# Patient Record
Sex: FEMALE | Race: WHITE | NOT HISPANIC OR LATINO | Employment: FULL TIME | URBAN - METROPOLITAN AREA
[De-identification: names, ages, dates, MRNs, and addresses within clinical notes are randomized per-mention and may not be internally consistent; named-entity substitution may affect disease eponyms.]

---

## 2023-04-17 ENCOUNTER — TELEPHONE (OUTPATIENT)
Dept: PRIMARY CARE | Facility: CLINIC | Age: 48
End: 2023-04-17
Payer: COMMERCIAL

## 2023-04-17 DIAGNOSIS — F98.8 ATTENTION DEFICIT DISORDER, UNSPECIFIED HYPERACTIVITY PRESENCE: ICD-10-CM

## 2023-04-17 DIAGNOSIS — G89.29 OTHER CHRONIC PAIN: ICD-10-CM

## 2023-04-17 PROBLEM — M54.2 NECK PAIN: Status: ACTIVE | Noted: 2023-04-17

## 2023-04-17 PROBLEM — I83.93 VARICOSE VEINS OF LEGS: Status: ACTIVE | Noted: 2023-04-17

## 2023-04-17 PROBLEM — J30.9 ALLERGIC RHINITIS: Status: ACTIVE | Noted: 2023-04-17

## 2023-04-17 PROBLEM — R31.21 ASYMPTOMATIC MICROSCOPIC HEMATURIA: Status: ACTIVE | Noted: 2023-04-17

## 2023-04-17 PROBLEM — M75.100 TEAR OF TENDON OF ROTATOR CUFF: Status: ACTIVE | Noted: 2023-04-17

## 2023-04-17 PROBLEM — D64.9 ANEMIA: Status: ACTIVE | Noted: 2023-04-17

## 2023-04-17 PROBLEM — E73.9 LACTOSE INTOLERANCE: Status: ACTIVE | Noted: 2023-04-17

## 2023-04-17 PROBLEM — R05.9 COUGH: Status: ACTIVE | Noted: 2023-04-17

## 2023-04-17 PROBLEM — M47.816 DJD (DEGENERATIVE JOINT DISEASE), LUMBAR: Status: ACTIVE | Noted: 2023-04-17

## 2023-04-17 PROBLEM — E55.9 VITAMIN D DEFICIENCY: Status: ACTIVE | Noted: 2023-04-17

## 2023-04-17 PROBLEM — Z20.822 EXPOSURE TO COVID-19 VIRUS: Status: ACTIVE | Noted: 2023-04-17

## 2023-04-17 PROBLEM — R53.83 FATIGUE: Status: ACTIVE | Noted: 2023-04-17

## 2023-04-17 PROBLEM — F11.90 CHRONIC NARCOTIC USE: Status: ACTIVE | Noted: 2023-04-17

## 2023-04-17 PROBLEM — M19.011 PRIMARY OSTEOARTHRITIS OF RIGHT SHOULDER: Status: ACTIVE | Noted: 2023-04-17

## 2023-04-17 RX ORDER — NALOXONE HYDROCHLORIDE 4 MG/.1ML
SPRAY NASAL
COMMUNITY
Start: 2020-03-30

## 2023-04-17 RX ORDER — LYSINE HCL 500 MG
1 TABLET ORAL 2 TIMES DAILY
COMMUNITY
Start: 2017-07-10

## 2023-04-17 RX ORDER — CYCLOBENZAPRINE HCL 10 MG
1 TABLET ORAL 3 TIMES DAILY PRN
COMMUNITY
Start: 2015-05-29 | End: 2023-10-27 | Stop reason: SDUPTHER

## 2023-04-17 RX ORDER — FLUTICASONE PROPIONATE 50 MCG
2 SPRAY, SUSPENSION (ML) NASAL DAILY
COMMUNITY
End: 2023-05-02 | Stop reason: SDUPTHER

## 2023-04-17 RX ORDER — HYDROCODONE BITARTRATE AND ACETAMINOPHEN 5; 300 MG/1; MG/1
TABLET ORAL
COMMUNITY
End: 2023-04-20 | Stop reason: SDUPTHER

## 2023-04-17 RX ORDER — METHYLPHENIDATE HYDROCHLORIDE 30 MG/1
1 CAPSULE, EXTENDED RELEASE ORAL EVERY MORNING
COMMUNITY
End: 2023-04-20 | Stop reason: SDUPTHER

## 2023-04-17 RX ORDER — ERGOCALCIFEROL 1.25 MG/1
1 CAPSULE ORAL
COMMUNITY
Start: 2019-07-25 | End: 2023-05-02 | Stop reason: ALTCHOICE

## 2023-04-17 RX ORDER — CELECOXIB 100 MG/1
1 CAPSULE ORAL 2 TIMES DAILY
COMMUNITY
End: 2023-05-02 | Stop reason: SDUPTHER

## 2023-04-17 NOTE — TELEPHONE ENCOUNTER
Refill(s) requested for:    1) Hydrocodone-Acetaminophen (5-300 mg)  2) Methylphenidate HCl ER CD (30 mg)    Pharmacy:   Pharmacy Address: [id number] [facility]     LR: 03/20/2023  LV:  01/23/2023  NV: 05/02/2023

## 2023-04-20 RX ORDER — METHYLPHENIDATE HYDROCHLORIDE 30 MG/1
30 CAPSULE, EXTENDED RELEASE ORAL EVERY MORNING
Qty: 30 CAPSULE | Refills: 0 | Status: SHIPPED | OUTPATIENT
Start: 2023-04-20 | End: 2023-05-02 | Stop reason: SDUPTHER

## 2023-04-20 RX ORDER — HYDROCODONE BITARTRATE AND ACETAMINOPHEN 5; 300 MG/1; MG/1
1 TABLET ORAL DAILY
Qty: 20 TABLET | Refills: 0 | Status: SHIPPED | OUTPATIENT
Start: 2023-04-20 | End: 2023-05-02 | Stop reason: SDUPTHER

## 2023-05-02 ENCOUNTER — OFFICE VISIT (OUTPATIENT)
Dept: PRIMARY CARE | Facility: CLINIC | Age: 48
End: 2023-05-02
Payer: COMMERCIAL

## 2023-05-02 VITALS
DIASTOLIC BLOOD PRESSURE: 78 MMHG | HEIGHT: 65 IN | SYSTOLIC BLOOD PRESSURE: 118 MMHG | HEART RATE: 83 BPM | OXYGEN SATURATION: 100 % | BODY MASS INDEX: 22.99 KG/M2 | WEIGHT: 138 LBS

## 2023-05-02 DIAGNOSIS — F98.8 ATTENTION DEFICIT DISORDER, UNSPECIFIED HYPERACTIVITY PRESENCE: Primary | ICD-10-CM

## 2023-05-02 DIAGNOSIS — J30.1 SEASONAL ALLERGIC RHINITIS DUE TO POLLEN: ICD-10-CM

## 2023-05-02 DIAGNOSIS — Z02.83 ENCOUNTER FOR DRUG SCREENING: ICD-10-CM

## 2023-05-02 DIAGNOSIS — R53.83 FATIGUE, UNSPECIFIED TYPE: ICD-10-CM

## 2023-05-02 DIAGNOSIS — Z51.81 ENCOUNTER FOR THERAPEUTIC DRUG LEVEL MONITORING: ICD-10-CM

## 2023-05-02 DIAGNOSIS — M47.816 SPONDYLOSIS OF LUMBAR REGION WITHOUT MYELOPATHY OR RADICULOPATHY: ICD-10-CM

## 2023-05-02 DIAGNOSIS — F11.90 CHRONIC NARCOTIC USE: ICD-10-CM

## 2023-05-02 DIAGNOSIS — G89.29 OTHER CHRONIC PAIN: ICD-10-CM

## 2023-05-02 PROCEDURE — 80361 OPIATES 1 OR MORE: CPT

## 2023-05-02 PROCEDURE — 80373 DRUG SCREENING TRAMADOL: CPT

## 2023-05-02 PROCEDURE — 80307 DRUG TEST PRSMV CHEM ANLYZR: CPT

## 2023-05-02 PROCEDURE — 80365 DRUG SCREENING OXYCODONE: CPT

## 2023-05-02 PROCEDURE — 80346 BENZODIAZEPINES1-12: CPT

## 2023-05-02 PROCEDURE — 80354 DRUG SCREENING FENTANYL: CPT

## 2023-05-02 PROCEDURE — 80368 SEDATIVE HYPNOTICS: CPT

## 2023-05-02 PROCEDURE — 99214 OFFICE O/P EST MOD 30 MIN: CPT | Performed by: FAMILY MEDICINE

## 2023-05-02 PROCEDURE — 80358 DRUG SCREENING METHADONE: CPT

## 2023-05-02 RX ORDER — HYDROCODONE BITARTRATE AND ACETAMINOPHEN 5; 300 MG/1; MG/1
1 TABLET ORAL DAILY
Qty: 30 TABLET | Refills: 0 | Status: SHIPPED | OUTPATIENT
Start: 2023-06-07 | End: 2023-07-17 | Stop reason: SDUPTHER

## 2023-05-02 RX ORDER — HYDROCODONE BITARTRATE AND ACETAMINOPHEN 5; 300 MG/1; MG/1
1 TABLET ORAL DAILY
Qty: 30 TABLET | Refills: 0 | Status: SHIPPED | OUTPATIENT
Start: 2023-05-10 | End: 2023-07-17 | Stop reason: SDUPTHER

## 2023-05-02 RX ORDER — MULTIVITAMIN/IRON/FOLIC ACID 18MG-0.4MG
1 TABLET ORAL DAILY
COMMUNITY

## 2023-05-02 RX ORDER — HYDROCODONE BITARTRATE AND ACETAMINOPHEN 5; 300 MG/1; MG/1
1 TABLET ORAL DAILY
Qty: 30 TABLET | Refills: 0 | Status: SHIPPED | OUTPATIENT
Start: 2023-07-05 | End: 2023-07-17 | Stop reason: SDUPTHER

## 2023-05-02 RX ORDER — FLUTICASONE PROPIONATE 50 MCG
2 SPRAY, SUSPENSION (ML) NASAL DAILY
Qty: 16 G | Refills: 3 | Status: SHIPPED | OUTPATIENT
Start: 2023-05-02 | End: 2023-10-27 | Stop reason: SDUPTHER

## 2023-05-02 RX ORDER — METHYLPHENIDATE HYDROCHLORIDE 30 MG/1
30 CAPSULE, EXTENDED RELEASE ORAL EVERY MORNING
Qty: 30 CAPSULE | Refills: 0 | Status: SHIPPED | OUTPATIENT
Start: 2023-05-10 | End: 2023-07-17 | Stop reason: SDUPTHER

## 2023-05-02 RX ORDER — VIT C/E/ZN/COPPR/LUTEIN/ZEAXAN 250MG-90MG
25 CAPSULE ORAL DAILY
COMMUNITY

## 2023-05-02 RX ORDER — ZINC GLUCONATE 100 MG
TABLET ORAL
COMMUNITY

## 2023-05-02 RX ORDER — METHYLPHENIDATE HYDROCHLORIDE 30 MG/1
30 CAPSULE, EXTENDED RELEASE ORAL EVERY MORNING
Qty: 30 CAPSULE | Refills: 0 | Status: SHIPPED | OUTPATIENT
Start: 2023-07-05 | End: 2023-07-17 | Stop reason: SDUPTHER

## 2023-05-02 RX ORDER — METHYLPHENIDATE HYDROCHLORIDE 30 MG/1
30 CAPSULE, EXTENDED RELEASE ORAL EVERY MORNING
Qty: 30 CAPSULE | Refills: 0 | Status: SHIPPED | OUTPATIENT
Start: 2023-06-07 | End: 2023-07-17 | Stop reason: SDUPTHER

## 2023-05-02 RX ORDER — CELECOXIB 100 MG/1
100 CAPSULE ORAL 2 TIMES DAILY
Qty: 180 CAPSULE | Refills: 2 | Status: SHIPPED | OUTPATIENT
Start: 2023-05-02 | End: 2023-10-27 | Stop reason: SDUPTHER

## 2023-05-02 RX ORDER — ASCORBIC ACID 500 MG
500 TABLET ORAL DAILY
COMMUNITY

## 2023-05-02 ASSESSMENT — PATIENT HEALTH QUESTIONNAIRE - PHQ9
2. FEELING DOWN, DEPRESSED OR HOPELESS: NOT AT ALL
SUM OF ALL RESPONSES TO PHQ9 QUESTIONS 1 & 2: 0
1. LITTLE INTEREST OR PLEASURE IN DOING THINGS: NOT AT ALL

## 2023-05-05 LAB
6-ACETYLMORPHINE: <25 NG/ML
7-AMINOCLONAZEPAM: <25 NG/ML
ALPHA-HYDROXYALPRAZOLAM: <25 NG/ML
ALPHA-HYDROXYMIDAZOLAM: <25 NG/ML
ALPRAZOLAM: <25 NG/ML
AMPHETAMINE (PRESENCE) IN URINE BY SCREEN METHOD: ABNORMAL
BARBITURATES PRESENCE IN URINE BY SCREEN METHOD: ABNORMAL
CANNABINOIDS IN URINE BY SCREEN METHOD: ABNORMAL
CHLORDIAZEPOXIDE: <25 NG/ML
CLONAZEPAM: <25 NG/ML
COCAINE (PRESENCE) IN URINE BY SCREEN METHOD: ABNORMAL
CODEINE: <50 NG/ML
CREATINE, URINE FOR DRUG: 106.2 MG/DL
DIAZEPAM: <25 NG/ML
DRUG SCREEN COMMENT URINE: ABNORMAL
EDDP: <25 NG/ML
FENTANYL CONFIRMATION, URINE: <2.5 NG/ML
HYDROCODONE: 44 NG/ML
HYDROMORPHONE: 41 NG/ML
LORAZEPAM: <25 NG/ML
METHADONE CONFIRMATION,URINE: <25 NG/ML
MIDAZOLAM: <25 NG/ML
MORPHINE URINE: <50 NG/ML
NORDIAZEPAM: <25 NG/ML
NORFENTANYL: <2.5 NG/ML
NORHYDROCODONE: 307 NG/ML
NOROXYCODONE: <25 NG/ML
O-DESMETHYLTRAMADOL: <50 NG/ML
OXAZEPAM: <25 NG/ML
OXYCODONE: <25 NG/ML
OXYMORPHONE: <25 NG/ML
PHENCYCLIDINE (PRESENCE) IN URINE BY SCREEN METHOD: ABNORMAL
TEMAZEPAM: <25 NG/ML
TRAMADOL: <50 NG/ML
ZOLPIDEM METABOLITE (ZCA): <25 NG/ML
ZOLPIDEM: <25 NG/ML

## 2023-07-17 ENCOUNTER — OFFICE VISIT (OUTPATIENT)
Dept: PRIMARY CARE | Facility: CLINIC | Age: 48
End: 2023-07-17
Payer: COMMERCIAL

## 2023-07-17 VITALS — DIASTOLIC BLOOD PRESSURE: 60 MMHG | SYSTOLIC BLOOD PRESSURE: 128 MMHG

## 2023-07-17 DIAGNOSIS — G89.4 CHRONIC PAIN SYNDROME: ICD-10-CM

## 2023-07-17 DIAGNOSIS — G89.29 OTHER CHRONIC PAIN: ICD-10-CM

## 2023-07-17 DIAGNOSIS — F11.90 CHRONIC NARCOTIC USE: ICD-10-CM

## 2023-07-17 DIAGNOSIS — M47.816 SPONDYLOSIS OF LUMBAR REGION WITHOUT MYELOPATHY OR RADICULOPATHY: ICD-10-CM

## 2023-07-17 DIAGNOSIS — F98.8 ATTENTION DEFICIT DISORDER, UNSPECIFIED HYPERACTIVITY PRESENCE: ICD-10-CM

## 2023-07-17 DIAGNOSIS — F98.8 ATTENTION DEFICIT DISORDER (ADD) WITHOUT HYPERACTIVITY: Primary | ICD-10-CM

## 2023-07-17 PROCEDURE — 99213 OFFICE O/P EST LOW 20 MIN: CPT | Performed by: FAMILY MEDICINE

## 2023-07-17 RX ORDER — METHYLPHENIDATE HYDROCHLORIDE 30 MG/1
30 CAPSULE, EXTENDED RELEASE ORAL EVERY MORNING
Qty: 30 CAPSULE | Refills: 0 | Status: SHIPPED | OUTPATIENT
Start: 2023-09-08 | End: 2023-10-27 | Stop reason: SDUPTHER

## 2023-07-17 RX ORDER — HYDROCODONE BITARTRATE AND ACETAMINOPHEN 5; 300 MG/1; MG/1
1 TABLET ORAL DAILY
Qty: 30 TABLET | Refills: 0 | Status: SHIPPED | OUTPATIENT
Start: 2023-10-06 | End: 2023-10-27 | Stop reason: SDUPTHER

## 2023-07-17 RX ORDER — METHYLPHENIDATE HYDROCHLORIDE 30 MG/1
30 CAPSULE, EXTENDED RELEASE ORAL EVERY MORNING
Qty: 30 CAPSULE | Refills: 0 | Status: SHIPPED | OUTPATIENT
Start: 2023-10-06 | End: 2023-10-27 | Stop reason: SDUPTHER

## 2023-07-17 RX ORDER — METHYLPHENIDATE HYDROCHLORIDE 30 MG/1
30 CAPSULE, EXTENDED RELEASE ORAL EVERY MORNING
Qty: 30 CAPSULE | Refills: 0 | Status: SHIPPED | OUTPATIENT
Start: 2023-08-11 | End: 2023-10-27 | Stop reason: SDUPTHER

## 2023-07-17 RX ORDER — HYDROCODONE BITARTRATE AND ACETAMINOPHEN 5; 300 MG/1; MG/1
1 TABLET ORAL DAILY
Qty: 30 TABLET | Refills: 0 | Status: SHIPPED | OUTPATIENT
Start: 2023-09-08 | End: 2023-10-27 | Stop reason: SDUPTHER

## 2023-07-17 RX ORDER — HYDROCODONE BITARTRATE AND ACETAMINOPHEN 5; 300 MG/1; MG/1
1 TABLET ORAL DAILY
Qty: 30 TABLET | Refills: 0 | Status: SHIPPED | OUTPATIENT
Start: 2023-08-11 | End: 2023-10-27 | Stop reason: SDUPTHER

## 2023-07-17 NOTE — PROGRESS NOTES
HPI 48 y.o. female presents for evaluation and refill of controlled substance.      ADD is well controlled with current dose of Ritalin.  Patient states she is compliant with medication.  Her last dose was this morning.  She denies tremors, palpitations and insomnia.    Chronic osteoarthritis pain is managed with 1/2-1 Norco daily.  Her last dose was last night.  She denies constipation.  She has also using Celebrex and Flexeril as needed for additional pain relief.  She remains active, employed and has not fallen.    Past Medical History:   Diagnosis Date    Anxiety disorder, unspecified 10/21/2019    Anxiety and depression    Complete rotator cuff tear or rupture of right shoulder, not specified as traumatic 12/15/2017    Complete tear of right rotator cuff    COVID-19     COVID-19 virus infection    Metabolic syndrome 2018    Syndrome X, metabolic    Personal history of other medical treatment     History of mammogram      Past Surgical History:   Procedure Laterality Date     SECTION, CLASSIC  2014     Section    COLONOSCOPY  2018    Complete Colonoscopy     No family history on file.   Social History     Socioeconomic History    Marital status:      Spouse name: Not on file    Number of children: Not on file    Years of education: Not on file    Highest education level: Not on file   Occupational History    Not on file   Tobacco Use    Smoking status: Some Days     Types: Cigarettes    Smokeless tobacco: Never   Substance and Sexual Activity    Alcohol use: Never    Drug use: Yes     Types: Hydrocodone, Methylphenidate    Sexual activity: Not on file   Other Topics Concern    Not on file   Social History Narrative    Not on file     Social Determinants of Health     Financial Resource Strain: Not on file   Food Insecurity: Not on file   Transportation Needs: Not on file   Physical Activity: Not on file   Stress: Not on file   Social Connections: Not on file   Intimate  Partner Violence: Not on file   Housing Stability: Not on file       Current Outpatient Medications on File Prior to Visit   Medication Sig Dispense Refill    HYDROcodone-acetaminophen (Vicodin) 5-300 mg tablet Take 1 tablet by mouth once daily. TAKE 1/2 TO 1 TABLET BY MOUTH DAILY AS NEEDED FOR PAIN Do not start before May 10, 2023. 30 tablet 0    methylphenidate CD (Metadate CD) 30 mg daily capsule Take 1 capsule (30 mg) by mouth once daily in the morning. Do not start before May 10, 2023. 30 capsule 0    ascorbic acid (Vitamin C) 500 mg tablet Take 1 tablet (500 mg) by mouth once daily.      b complex 0.4 mg tablet Take 1 tablet by mouth once daily.      calcium carbonate-vit D3-min 600 mg calcium- 400 unit tablet Take 1 tablet by mouth in the morning and 1 tablet before bedtime.      celecoxib (CeleBREX) 100 mg capsule Take 1 capsule (100 mg) by mouth 2 times a day. 180 capsule 2    cholecalciferol (Vitamin D3) 25 MCG (1000 UT) capsule Take 1 capsule (25 mcg) by mouth once daily.      cyclobenzaprine (Flexeril) 10 mg tablet Take 1 tablet (10 mg) by mouth 3 times a day as needed for muscle spasms.      fluticasone (Flonase) 50 mcg/actuation nasal spray Administer 2 sprays into each nostril once daily. 16 g 3    HYDROcodone-acetaminophen (Vicodin) 5-300 mg tablet Take 1 tablet by mouth once daily. TAKE 1/2 TO 1 TABLET BY MOUTH DAILY AS NEEDED FOR PAIN Do not start before June 7, 2023. 30 tablet 0    HYDROcodone-acetaminophen (Vicodin) 5-300 mg tablet Take 1 tablet by mouth once daily. TAKE 1/2 TO 1 TABLET BY MOUTH DAILY AS NEEDED FOR PAIN Do not start before July 5, 2023. 30 tablet 0    iron bis-glycinat/vit C/FA/B12 (GENTLE IRON ORAL) Take by mouth.      methylphenidate CD (Metadate CD) 30 mg daily capsule Take 1 capsule (30 mg) by mouth once daily in the morning. Do not start before June 7, 2023. 30 capsule 0    methylphenidate CD (Metadate CD) 30 mg daily capsule Take 1 capsule (30 mg) by mouth once daily in the  morning. Do not start before July 5, 2023. 30 capsule 0    naloxone (Narcan) 4 mg/0.1 mL nasal spray Administer into affected nostril(s). 1 actuation in one nostril x 1, may reat dose q2-3 mins until responsive or EMS arrives      zinc gluconate 100 mg tablet Take by mouth.       No current facility-administered medications on file prior to visit.       No Known Allergies    Visit Vitals  /60   Smoking Status Some Days        EXAM:  Alert and oriented ×3.  No acute distress.  No tremors noted.  Gait is normal.  Mood and affect are normal.     Assessment/Diagnosis  1. Attention deficit disorder (ADD) without hyperactivity    2. Chronic narcotic use    3. Chronic pain syndrome    4. Spondylosis of lumbar region without myelopathy or radiculopathy  - HYDROcodone-acetaminophen (Vicodin) 5-300 mg tablet; Take 1 tablet by mouth once daily. TAKE 1/2 TO 1 TABLET BY MOUTH DAILY AS NEEDED FOR PAIN Do not start before October 6, 2023.  Dispense: 30 tablet; Refill: 0  - HYDROcodone-acetaminophen (Vicodin) 5-300 mg tablet; Take 1 tablet by mouth once daily. TAKE 1/2 TO 1 TABLET BY MOUTH DAILY AS NEEDED FOR PAIN Do not start before September 8, 2023.  Dispense: 30 tablet; Refill: 0  - HYDROcodone-acetaminophen (Vicodin) 5-300 mg tablet; Take 1 tablet by mouth once daily. TAKE 1/2 TO 1 TABLET BY MOUTH DAILY AS NEEDED FOR PAIN Do not start before August 11, 2023.  Dispense: 30 tablet; Refill: 0    6. Attention deficit disorder, unspecified hyperactivity presence  - methylphenidate CD (Metadate CD) 30 mg daily capsule; Take 1 capsule (30 mg) by mouth once daily in the morning. Do not start before August 11, 2023.  Dispense: 30 capsule; Refill: 0  - methylphenidate CD (Metadate CD) 30 mg daily capsule; Take 1 capsule (30 mg) by mouth once daily in the morning. Do not start before October 6, 2023.  Dispense: 30 capsule; Refill: 0  - methylphenidate CD (Metadate CD) 30 mg daily capsule; Take 1 capsule (30 mg) by mouth once daily  in the morning. Do not start before September 8, 2023.  Dispense: 30 capsule; Refill: 0    .    Plan    [place] reviewed.  Controlled Substance Agreement is current.  Urine drug screen up to date.    CONTROLLED SUBSTANCE USE:   Patient is aware of Dr. Diaz's and Estefany Darling's practice rules for use of scheduled medication.  Has a signed contract stating that patient will only receive controlled substance prescriptions from Dr. Diaz, will only receive a one month supply, will fill prescriptions at one pharmacy, and agrees to a random urine drug screen.  Patient is aware that she must have an office appointment every 90 days to continue to receive benzodiazepines or narcotics.        Follow up in 3 months for annual comprehensive medical evaluation    I will continue to monitor, evaluate, assess and treat all problems/diagnoses as appropriate and continue to collaborate with specialists.    Contact office or send a  [employer] message with any questions or concerns    Patient will only be notified of labs that require medical intervention.    Prescriptions will not be filled unless you are compliant with your follow up appointments or have a follow up appointment scheduled as per instruction of your physician. Refills should be requested at the time of your visit.    **Charting was completed using voice recognition technology and may include unintended errors**    Gary Diaz DO, FACOFP  Senior Attending Physician  [facility]  [id number]  [place] [address] [phone]

## 2023-10-25 ENCOUNTER — LAB (OUTPATIENT)
Dept: LAB | Facility: LAB | Age: 48
End: 2023-10-25
Payer: COMMERCIAL

## 2023-10-25 DIAGNOSIS — R53.83 FATIGUE, UNSPECIFIED TYPE: ICD-10-CM

## 2023-10-25 DIAGNOSIS — Z13.220 SCREENING, LIPID: ICD-10-CM

## 2023-10-25 DIAGNOSIS — E55.9 VITAMIN D DEFICIENCY: ICD-10-CM

## 2023-10-25 DIAGNOSIS — Z00.00 ENCOUNTER FOR HEALTH MAINTENANCE EXAMINATION: ICD-10-CM

## 2023-10-25 LAB
25(OH)D3 SERPL-MCNC: 27 NG/ML (ref 30–100)
ALBUMIN SERPL BCP-MCNC: 3.9 G/DL (ref 3.4–5)
ALP SERPL-CCNC: 55 U/L (ref 33–110)
ALT SERPL W P-5'-P-CCNC: 11 U/L (ref 7–45)
ANION GAP SERPL CALC-SCNC: 10 MMOL/L (ref 10–20)
APPEARANCE UR: CLEAR
AST SERPL W P-5'-P-CCNC: 14 U/L (ref 9–39)
BACTERIA #/AREA URNS AUTO: ABNORMAL /HPF
BILIRUB SERPL-MCNC: 0.4 MG/DL (ref 0–1.2)
BILIRUB UR STRIP.AUTO-MCNC: NEGATIVE MG/DL
BUN SERPL-MCNC: 7 MG/DL (ref 6–23)
CALCIUM SERPL-MCNC: 9.2 MG/DL (ref 8.6–10.3)
CHLORIDE SERPL-SCNC: 105 MMOL/L (ref 98–107)
CHOLEST SERPL-MCNC: 150 MG/DL (ref 0–199)
CHOLESTEROL/HDL RATIO: 2.8
CO2 SERPL-SCNC: 27 MMOL/L (ref 21–32)
COLOR UR: YELLOW
CREAT SERPL-MCNC: 0.54 MG/DL (ref 0.5–1.05)
ERYTHROCYTE [DISTWIDTH] IN BLOOD BY AUTOMATED COUNT: 15.1 % (ref 11.5–14.5)
GFR SERPL CREATININE-BSD FRML MDRD: >90 ML/MIN/1.73M*2
GLUCOSE SERPL-MCNC: 81 MG/DL (ref 74–99)
GLUCOSE UR STRIP.AUTO-MCNC: NEGATIVE MG/DL
HCT VFR BLD AUTO: 38.3 % (ref 36–46)
HDLC SERPL-MCNC: 54 MG/DL
HGB BLD-MCNC: 11.9 G/DL (ref 12–16)
KETONES UR STRIP.AUTO-MCNC: NEGATIVE MG/DL
LDLC SERPL CALC-MCNC: 83 MG/DL
LEUKOCYTE ESTERASE UR QL STRIP.AUTO: NEGATIVE
MCH RBC QN AUTO: 27 PG (ref 26–34)
MCHC RBC AUTO-ENTMCNC: 31.1 G/DL (ref 32–36)
MCV RBC AUTO: 87 FL (ref 80–100)
NITRITE UR QL STRIP.AUTO: NEGATIVE
NON HDL CHOLESTEROL: 96 MG/DL (ref 0–149)
NRBC BLD-RTO: 0 /100 WBCS (ref 0–0)
PH UR STRIP.AUTO: 7 [PH]
PLATELET # BLD AUTO: 309 X10*3/UL (ref 150–450)
PMV BLD AUTO: 10.9 FL (ref 7.5–11.5)
POTASSIUM SERPL-SCNC: 4.6 MMOL/L (ref 3.5–5.3)
PROT SERPL-MCNC: 6.5 G/DL (ref 6.4–8.2)
PROT UR STRIP.AUTO-MCNC: NEGATIVE MG/DL
RBC # BLD AUTO: 4.41 X10*6/UL (ref 4–5.2)
RBC # UR STRIP.AUTO: ABNORMAL /UL
RBC #/AREA URNS AUTO: ABNORMAL /HPF
SODIUM SERPL-SCNC: 137 MMOL/L (ref 136–145)
SP GR UR STRIP.AUTO: 1.01
SQUAMOUS #/AREA URNS AUTO: ABNORMAL /HPF
TRIGL SERPL-MCNC: 63 MG/DL (ref 0–149)
TSH SERPL-ACNC: 1.7 MIU/L (ref 0.44–3.98)
UROBILINOGEN UR STRIP.AUTO-MCNC: <2 MG/DL
VLDL: 13 MG/DL (ref 0–40)
WBC # BLD AUTO: 7.5 X10*3/UL (ref 4.4–11.3)
WBC #/AREA URNS AUTO: ABNORMAL /HPF

## 2023-10-25 PROCEDURE — 84443 ASSAY THYROID STIM HORMONE: CPT

## 2023-10-25 PROCEDURE — 36415 COLL VENOUS BLD VENIPUNCTURE: CPT

## 2023-10-25 PROCEDURE — 81001 URINALYSIS AUTO W/SCOPE: CPT

## 2023-10-25 PROCEDURE — 82306 VITAMIN D 25 HYDROXY: CPT

## 2023-10-25 PROCEDURE — 85027 COMPLETE CBC AUTOMATED: CPT

## 2023-10-25 PROCEDURE — 80053 COMPREHEN METABOLIC PANEL: CPT

## 2023-10-25 PROCEDURE — 80061 LIPID PANEL: CPT

## 2023-10-27 ENCOUNTER — OFFICE VISIT (OUTPATIENT)
Dept: PRIMARY CARE | Facility: CLINIC | Age: 48
End: 2023-10-27
Payer: COMMERCIAL

## 2023-10-27 VITALS
SYSTOLIC BLOOD PRESSURE: 122 MMHG | DIASTOLIC BLOOD PRESSURE: 70 MMHG | OXYGEN SATURATION: 99 % | WEIGHT: 134 LBS | HEIGHT: 65 IN | HEART RATE: 108 BPM | BODY MASS INDEX: 22.33 KG/M2

## 2023-10-27 DIAGNOSIS — M47.816 SPONDYLOSIS OF LUMBAR REGION WITHOUT MYELOPATHY OR RADICULOPATHY: ICD-10-CM

## 2023-10-27 DIAGNOSIS — G89.29 OTHER CHRONIC PAIN: ICD-10-CM

## 2023-10-27 DIAGNOSIS — R19.5 LOOSE STOOLS: ICD-10-CM

## 2023-10-27 DIAGNOSIS — J30.1 SEASONAL ALLERGIC RHINITIS DUE TO POLLEN: ICD-10-CM

## 2023-10-27 DIAGNOSIS — Z13.220 SCREENING, LIPID: ICD-10-CM

## 2023-10-27 DIAGNOSIS — Z12.11 COLON CANCER SCREENING: Primary | ICD-10-CM

## 2023-10-27 DIAGNOSIS — F98.8 ATTENTION DEFICIT DISORDER, UNSPECIFIED HYPERACTIVITY PRESENCE: ICD-10-CM

## 2023-10-27 DIAGNOSIS — Z00.00 ENCOUNTER FOR HEALTH MAINTENANCE EXAMINATION: ICD-10-CM

## 2023-10-27 DIAGNOSIS — Z12.31 ENCOUNTER FOR SCREENING MAMMOGRAM FOR MALIGNANT NEOPLASM OF BREAST: ICD-10-CM

## 2023-10-27 DIAGNOSIS — E55.9 VITAMIN D DEFICIENCY: ICD-10-CM

## 2023-10-27 PROCEDURE — 93000 ELECTROCARDIOGRAM COMPLETE: CPT | Performed by: FAMILY MEDICINE

## 2023-10-27 PROCEDURE — 99213 OFFICE O/P EST LOW 20 MIN: CPT | Performed by: FAMILY MEDICINE

## 2023-10-27 PROCEDURE — 99396 PREV VISIT EST AGE 40-64: CPT | Performed by: FAMILY MEDICINE

## 2023-10-27 RX ORDER — METHYLPHENIDATE HYDROCHLORIDE 30 MG/1
30 CAPSULE, EXTENDED RELEASE ORAL EVERY MORNING
Qty: 30 CAPSULE | Refills: 0 | Status: SHIPPED | OUTPATIENT
Start: 2023-12-03 | End: 2024-01-18 | Stop reason: SDUPTHER

## 2023-10-27 RX ORDER — FLUTICASONE PROPIONATE 50 MCG
2 SPRAY, SUSPENSION (ML) NASAL DAILY
Qty: 16 G | Refills: 3 | Status: SHIPPED | OUTPATIENT
Start: 2023-10-27 | End: 2024-04-18 | Stop reason: SDUPTHER

## 2023-10-27 RX ORDER — METHYLPHENIDATE HYDROCHLORIDE 30 MG/1
30 CAPSULE, EXTENDED RELEASE ORAL EVERY MORNING
Qty: 30 CAPSULE | Refills: 0 | Status: SHIPPED | OUTPATIENT
Start: 2023-11-05 | End: 2024-01-18 | Stop reason: SDUPTHER

## 2023-10-27 RX ORDER — METHYLPHENIDATE HYDROCHLORIDE 30 MG/1
30 CAPSULE, EXTENDED RELEASE ORAL EVERY MORNING
Qty: 30 CAPSULE | Refills: 0 | Status: SHIPPED | OUTPATIENT
Start: 2023-12-31 | End: 2024-01-18 | Stop reason: SDUPTHER

## 2023-10-27 RX ORDER — CYCLOBENZAPRINE HCL 10 MG
10 TABLET ORAL 3 TIMES DAILY PRN
Qty: 60 TABLET | Refills: 2 | Status: SHIPPED | OUTPATIENT
Start: 2023-10-27

## 2023-10-27 RX ORDER — HYDROCODONE BITARTRATE AND ACETAMINOPHEN 5; 300 MG/1; MG/1
1 TABLET ORAL DAILY
Qty: 30 TABLET | Refills: 0 | Status: SHIPPED | OUTPATIENT
Start: 2023-12-31 | End: 2024-01-18 | Stop reason: SDUPTHER

## 2023-10-27 RX ORDER — ERGOCALCIFEROL 1.25 MG/1
50000 CAPSULE ORAL
Qty: 12 CAPSULE | Refills: 3 | Status: SHIPPED | OUTPATIENT
Start: 2023-10-27 | End: 2024-09-27

## 2023-10-27 RX ORDER — HYDROCODONE BITARTRATE AND ACETAMINOPHEN 5; 300 MG/1; MG/1
1 TABLET ORAL DAILY
Qty: 30 TABLET | Refills: 0 | Status: SHIPPED | OUTPATIENT
Start: 2023-11-05 | End: 2024-01-18 | Stop reason: SDUPTHER

## 2023-10-27 RX ORDER — CELECOXIB 100 MG/1
100 CAPSULE ORAL 2 TIMES DAILY
Qty: 180 CAPSULE | Refills: 2 | Status: SHIPPED | OUTPATIENT
Start: 2023-10-27 | End: 2024-04-18 | Stop reason: SDUPTHER

## 2023-10-27 RX ORDER — HYDROCODONE BITARTRATE AND ACETAMINOPHEN 5; 300 MG/1; MG/1
1 TABLET ORAL DAILY
Qty: 30 TABLET | Refills: 0 | Status: SHIPPED | OUTPATIENT
Start: 2023-12-03 | End: 2024-01-18 | Stop reason: SDUPTHER

## 2023-10-27 ASSESSMENT — PATIENT HEALTH QUESTIONNAIRE - PHQ9
2. FEELING DOWN, DEPRESSED OR HOPELESS: NOT AT ALL
1. LITTLE INTEREST OR PLEASURE IN DOING THINGS: NOT AT ALL
SUM OF ALL RESPONSES TO PHQ9 QUESTIONS 1 & 2: 0

## 2023-10-27 NOTE — PATIENT INSTRUCTIONS
Schedule PAP smear    Bowels:  fiber 30 gm/day;  cereals: Fiber One or All Bran  probiotic x 30 days    Mammogram ordered  Cologuard ordered

## 2023-10-27 NOTE — PROGRESS NOTES
Annual Comprehensive Medical Exam    48 y.o. female presents for annual comprehensive medical evaluation and preventive services screening.  No recent hospitalizations, surgeries or significant injuries.    HPI    Digestion:  still gasey, crampy, loose stools.  No pain, bleeding.  Colonoscopy in 2018.  Dx Colagenous Colitis.  Was having hematochezia in 2018.  Tx with Rx (not sure what) caused resolution.      Vitamin D is low despite supplement    Osteoarthritis of the lumbar spine.  Patient takes Celebrex at least once per day.  Also uses 1 Norco daily, usually only 1/2 pill at bedtime.  Pain in winter is worse.  Has home exercise program.  Last dose of Norco was last night 1/2 pill.    ADD is well controlled with current dose of Ritalin.  Patient is compliant with medication and notes no side effects.    Due for mammogram and Pap smear.  Colon cancer screening done in 2018.  Patient requesting Cologuard    Past Medical History:   Diagnosis Date    Anxiety disorder, unspecified 10/21/2019    Anxiety and depression    Complete rotator cuff tear or rupture of right shoulder, not specified as traumatic 12/15/2017    Complete tear of right rotator cuff    COVID-19     COVID-19 virus infection    Metabolic syndrome 2018    Syndrome X, metabolic    Personal history of other medical treatment     History of mammogram      Past Surgical History:   Procedure Laterality Date     SECTION, CLASSIC  2014     Section    COLONOSCOPY  2018    Complete Colonoscopy     No family history on file.   Social History     Socioeconomic History    Marital status:      Spouse name: Not on file    Number of children: Not on file    Years of education: Not on file    Highest education level: Not on file   Occupational History    Not on file   Tobacco Use    Smoking status: Some Days     Types: Cigarettes    Smokeless tobacco: Never   Substance and Sexual Activity    Alcohol use: Never    Drug use: Yes      Types: Hydrocodone, Methylphenidate    Sexual activity: Not on file   Other Topics Concern    Not on file   Social History Narrative    Not on file     Social Determinants of Health     Financial Resource Strain: Not on file   Food Insecurity: Not on file   Transportation Needs: Not on file   Physical Activity: Not on file   Stress: Not on file   Social Connections: Not on file   Intimate Partner Violence: Not on file   Housing Stability: Not on file       Current Outpatient Medications on File Prior to Visit   Medication Sig Dispense Refill    ascorbic acid (Vitamin C) 500 mg tablet Take 1 tablet (500 mg) by mouth once daily.      b complex 0.4 mg tablet Take 1 tablet by mouth once daily.      calcium carbonate-vit D3-min 600 mg calcium- 400 unit tablet Take 1 tablet by mouth in the morning and 1 tablet before bedtime.      celecoxib (CeleBREX) 100 mg capsule Take 1 capsule (100 mg) by mouth 2 times a day. 180 capsule 2    cholecalciferol (Vitamin D3) 25 MCG (1000 UT) capsule Take 1 capsule (25 mcg) by mouth once daily.      cyclobenzaprine (Flexeril) 10 mg tablet Take 1 tablet (10 mg) by mouth 3 times a day as needed for muscle spasms.      fluticasone (Flonase) 50 mcg/actuation nasal spray Administer 2 sprays into each nostril once daily. 16 g 3    HYDROcodone-acetaminophen (Vicodin) 5-300 mg tablet Take 1 tablet by mouth once daily. TAKE 1/2 TO 1 TABLET BY MOUTH DAILY AS NEEDED FOR PAIN Do not start before October 6, 2023. 30 tablet 0    iron bis-glycinat/vit C/FA/B12 (GENTLE IRON ORAL) Take by mouth.      methylphenidate CD (Metadate CD) 30 mg daily capsule Take 1 capsule (30 mg) by mouth once daily in the morning. Do not start before August 11, 2023. 30 capsule 0    naloxone (Narcan) 4 mg/0.1 mL nasal spray Administer into affected nostril(s). 1 actuation in one nostril x 1, may reat dose q2-3 mins until responsive or EMS arrives      zinc gluconate 100 mg tablet Take by mouth.       "HYDROcodone-acetaminophen (Vicodin) 5-300 mg tablet Take 1 tablet by mouth once daily. TAKE 1/2 TO 1 TABLET BY MOUTH DAILY AS NEEDED FOR PAIN Do not start before September 8, 2023. 30 tablet 0    HYDROcodone-acetaminophen (Vicodin) 5-300 mg tablet Take 1 tablet by mouth once daily. TAKE 1/2 TO 1 TABLET BY MOUTH DAILY AS NEEDED FOR PAIN Do not start before August 11, 2023. 30 tablet 0    methylphenidate CD (Metadate CD) 30 mg daily capsule Take 1 capsule (30 mg) by mouth once daily in the morning. Do not start before October 6, 2023. 30 capsule 0    methylphenidate CD (Metadate CD) 30 mg daily capsule Take 1 capsule (30 mg) by mouth once daily in the morning. Do not start before September 8, 2023. 30 capsule 0     No current facility-administered medications on file prior to visit.       No Known Allergies    Complete review of systems is negative today except for that mentioned in the history of present illness.  In particular patient denies chest pain, shortness of breath, headaches and GI disturbances.      Visit Vitals  /70   Pulse 108   Ht 1.657 m (5' 5.25\")   Wt 60.8 kg (134 lb)   SpO2 99%   BMI 22.13 kg/m²   Smoking Status Some Days   BSA 1.67 m²      Physical Exam  Gen.: Alert and oriented ×3 female in no acute distress.  HEENT: Head is normocephalic.  Pupils equal and reactive to light.  Tympanic membranes are clear.  Pharynx is clear.  Neck is supple without adenopathy or carotid bruits.  No masses or thyromegaly  Heart: Regular rate and rhythm without murmurs.  Lungs: Clear to auscultation bilaterally.  Breasts: deferred to GYN at pt request.  Pelvic: Deferred to GYN at pt request.  Abdomen: Soft with normal bowel sounds.  No masses or pain to palpation.  No bruits auscultated.  Extremities: Good range of motion of all joints.  No significant edema. Pedal pulses +1-2/4  Skin: No significant or irregular nevi visualized.  Neuro: No signs of focal neurologic deficit.  No tremor.  Speech and hearing are " normal.  DTRs +3/4;  Muscle Strength +5/5.  Musculoskeletal: Spine with good ROM.  No scoliosis.  Leg lengths are equal.  Psych: normal affect.  No suicidal ideation.  Good judgement and insight.     Lab reviewed in detail with patient  ECG with normal sinus rhythm      Diagnosis/Plan  1. Encounter for health maintenance examination  - Comprehensive Metabolic Panel; Future  - CBC; Future  - Lipid Panel; Future  - TSH with reflex to Free T4 if abnormal; Future  - Urinalysis with Reflex Microscopic; Future  - ECG 12 lead (Clinic Performed)    3. Screening, lipid  - Lipid Panel; Future    4. Vitamin D deficiency  - Vitamin D 25-Hydroxy,Total (for eval of Vitamin D levels); Future  - ergocalciferol (Vitamin D-2) 1.25 MG (29561 UT) capsule; Take 1 capsule (50,000 Units) by mouth 1 (one) time per week.  Dispense: 12 capsule; Refill: 3    5. Colon cancer screening  - Cologuard® colon cancer screening; Future  - Cologuard® colon cancer screening    6. Loose stools  - Cryptosporidium antigen, stool; Future  - Giardia antigen; Future    7. Encounter for screening mammogram for malignant neoplasm of breast  - BI mammo bilateral screening tomosynthesis; Future    8. Lumbar DJD chronic pain    - celecoxib (CeleBREX) 100 mg capsule; Take 1 capsule (100 mg) by mouth 2 times a day.  Dispense: 180 capsule; Refill: 2  - HYDROcodone-acetaminophen (Vicodin) 5-300 mg tablet; Take 1 tablet by mouth once daily. TAKE 1/2 TO 1 TABLET BY MOUTH DAILY AS NEEDED FOR PAIN Do not start before December 31, 2023.  Dispense: 30 tablet; Refill: 0  - HYDROcodone-acetaminophen (Vicodin) 5-300 mg tablet; Take 1 tablet by mouth once daily. TAKE 1/2 TO 1 TABLET BY MOUTH DAILY AS NEEDED FOR PAIN Do not start before November 5, 2023.  Dispense: 30 tablet; Refill: 0  - HYDROcodone-acetaminophen (Vicodin) 5-300 mg tablet; Take 1 tablet by mouth once daily. TAKE 1/2 TO 1 TABLET BY MOUTH DAILY AS NEEDED FOR PAIN Do not start before December 3, 2023.  Dispense:  30 tablet; Refill: 0    9. Seasonal allergic rhinitis due to pollen  - fluticasone (Flonase) 50 mcg/actuation nasal spray; Administer 2 sprays into each nostril once daily.  Dispense: 16 g; Refill: 3    10. Attention deficit disorder, unspecified hyperactivity presence  - methylphenidate CD (Metadate CD) 30 mg daily capsule; Take 1 capsule (30 mg) by mouth once daily in the morning. Do not start before November 5, 2023.  Dispense: 30 capsule; Refill: 0  - methylphenidate CD (Metadate CD) 30 mg daily capsule; Take 1 capsule (30 mg) by mouth once daily in the morning. Do not start before December 3, 2023.  Dispense: 30 capsule; Refill: 0  - methylphenidate CD (Metadate CD) 30 mg daily capsule; Take 1 capsule (30 mg) by mouth once daily in the morning. Do not start before December 31, 2023.  Dispense: 30 capsule; Refill: 0    11. Spondylosis of lumbar region without myelopathy or radiculopathy  - cyclobenzaprine (Flexeril) 10 mg tablet; Take 1 tablet (10 mg) by mouth 3 times a day as needed for muscle spasms.  Dispense: 60 tablet; Refill: 2          Follow up in 3 months for controlled substance refill; 6 months for medical management  Return to office for Pap smear    I will continue to monitor, evaluate, assess and treat all problems/diagnoses as appropriate and continue to collaborate with specialists.    Contact office or send a  MY Chart message with any questions or concerns    Encouraged to sign up with my  My Chart  Patient will only be notified of labs that require medical intervention.    Prescriptions will not be filled unless you are compliant with your follow up appointments or have a follow up appointment scheduled as per instruction of your physician. Refills should be requested at the time of your visit.    **Charting was completed using voice recognition technology and may include unintended errors**    Gary Diaz DO, FACOFP  [id number]  [place] [address] [phone]      Gary  DO Joe, PEDRITOP

## 2023-12-14 ENCOUNTER — APPOINTMENT (OUTPATIENT)
Dept: PRIMARY CARE | Facility: CLINIC | Age: 48
End: 2023-12-14
Payer: COMMERCIAL

## 2024-01-02 ENCOUNTER — APPOINTMENT (OUTPATIENT)
Dept: PRIMARY CARE | Facility: CLINIC | Age: 49
End: 2024-01-02
Payer: COMMERCIAL

## 2024-01-15 ENCOUNTER — PROCEDURE VISIT (OUTPATIENT)
Dept: PRIMARY CARE | Facility: CLINIC | Age: 49
End: 2024-01-15
Payer: COMMERCIAL

## 2024-01-15 VITALS
SYSTOLIC BLOOD PRESSURE: 120 MMHG | TEMPERATURE: 98.7 F | WEIGHT: 133 LBS | BODY MASS INDEX: 21.96 KG/M2 | DIASTOLIC BLOOD PRESSURE: 84 MMHG

## 2024-01-15 DIAGNOSIS — Z12.4 CERVICAL CANCER SCREENING: ICD-10-CM

## 2024-01-15 PROCEDURE — 87624 HPV HI-RISK TYP POOLED RSLT: CPT

## 2024-01-15 PROCEDURE — 99213 OFFICE O/P EST LOW 20 MIN: CPT | Performed by: FAMILY MEDICINE

## 2024-01-15 PROCEDURE — 88175 CYTOPATH C/V AUTO FLUID REDO: CPT

## 2024-01-15 ASSESSMENT — PATIENT HEALTH QUESTIONNAIRE - PHQ9
1. LITTLE INTEREST OR PLEASURE IN DOING THINGS: NOT AT ALL
2. FEELING DOWN, DEPRESSED OR HOPELESS: NOT AT ALL
SUM OF ALL RESPONSES TO PHQ9 QUESTIONS 1 AND 2: 0

## 2024-01-15 NOTE — PROGRESS NOTES
Subjective   Patient ID: Loan [place] is a 48 y.o. female who presents for Gynecologic Exam.    Pt presents for annual GYN exam:    Last PAP: 4-5 years ago  See epic review, 2017 last PAP  LMP: early Dec  Menses has been irregular   1993- C section  Not currently sexually active  ,  had post op complications            Review of Systems    Objective   /84 (BP Location: Left arm, Patient Position: Sitting)   Temp 37.1 °C (98.7 °F)   Wt 60.3 kg (133 lb)   LMP 2023 (Approximate)   BMI 21.96 kg/m²     Physical Exam  Vitals and nursing note reviewed. Exam conducted with a chaperone present.   Genitourinary:     Vagina: Normal.      Cervix: Normal.      Uterus: Normal.       Adnexa: Right adnexa normal and left adnexa normal.         Assessment/Plan   Diagnoses and all orders for this visit:  Cervical cancer screening  -     THINPREP PAP TEST       Will notify pt of results when available  Kerri Nava DO

## 2024-01-18 ENCOUNTER — OFFICE VISIT (OUTPATIENT)
Dept: PRIMARY CARE | Facility: CLINIC | Age: 49
End: 2024-01-18
Payer: COMMERCIAL

## 2024-01-18 VITALS
HEIGHT: 65 IN | HEART RATE: 80 BPM | SYSTOLIC BLOOD PRESSURE: 128 MMHG | DIASTOLIC BLOOD PRESSURE: 80 MMHG | WEIGHT: 134 LBS | OXYGEN SATURATION: 98 % | BODY MASS INDEX: 22.33 KG/M2

## 2024-01-18 DIAGNOSIS — F11.90 CHRONIC NARCOTIC USE: ICD-10-CM

## 2024-01-18 DIAGNOSIS — M47.816 SPONDYLOSIS OF LUMBAR REGION WITHOUT MYELOPATHY OR RADICULOPATHY: Primary | ICD-10-CM

## 2024-01-18 DIAGNOSIS — F98.8 ATTENTION DEFICIT DISORDER (ADD) WITHOUT HYPERACTIVITY: ICD-10-CM

## 2024-01-18 PROBLEM — I83.90 VARICOSE VEINS OF LOWER EXTREMITY: Status: ACTIVE | Noted: 2023-04-17

## 2024-01-18 PROBLEM — J30.1 ALLERGIC RHINITIS DUE TO POLLEN: Status: ACTIVE | Noted: 2024-01-18

## 2024-01-18 PROBLEM — U07.1 DISEASE DUE TO SEVERE ACUTE RESPIRATORY SYNDROME CORONAVIRUS 2 (SARS-COV-2): Status: RESOLVED | Noted: 2024-01-18 | Resolved: 2024-01-18

## 2024-01-18 PROBLEM — J06.9 ACUTE UPPER RESPIRATORY INFECTION: Status: RESOLVED | Noted: 2024-01-18 | Resolved: 2024-01-18

## 2024-01-18 PROBLEM — M62.830 SPASM OF MUSCLE OF LOWER BACK: Status: ACTIVE | Noted: 2023-04-17

## 2024-01-18 PROBLEM — R19.5 LOOSE STOOLS: Status: RESOLVED | Noted: 2024-01-18 | Resolved: 2024-01-18

## 2024-01-18 PROBLEM — M75.81 TENDINITIS OF RIGHT ROTATOR CUFF: Status: ACTIVE | Noted: 2024-01-18

## 2024-01-18 PROCEDURE — 99213 OFFICE O/P EST LOW 20 MIN: CPT | Performed by: FAMILY MEDICINE

## 2024-01-18 RX ORDER — HYDROCODONE BITARTRATE AND ACETAMINOPHEN 5; 300 MG/1; MG/1
1 TABLET ORAL DAILY
Qty: 30 TABLET | Refills: 0 | Status: SHIPPED | OUTPATIENT
Start: 2024-03-01 | End: 2024-04-18 | Stop reason: SDUPTHER

## 2024-01-18 RX ORDER — HYDROCODONE BITARTRATE AND ACETAMINOPHEN 5; 300 MG/1; MG/1
1 TABLET ORAL DAILY
Qty: 30 TABLET | Refills: 0 | Status: SHIPPED | OUTPATIENT
Start: 2024-02-02 | End: 2024-04-18 | Stop reason: SDUPTHER

## 2024-01-18 RX ORDER — HYDROCODONE BITARTRATE AND ACETAMINOPHEN 5; 300 MG/1; MG/1
1 TABLET ORAL DAILY
Qty: 30 TABLET | Refills: 0 | Status: SHIPPED | OUTPATIENT
Start: 2024-03-30 | End: 2024-04-18 | Stop reason: SDUPTHER

## 2024-01-18 RX ORDER — METHYLPHENIDATE HYDROCHLORIDE 30 MG/1
30 CAPSULE, EXTENDED RELEASE ORAL EVERY MORNING
Qty: 30 CAPSULE | Refills: 0 | Status: SHIPPED | OUTPATIENT
Start: 2024-03-01 | End: 2024-04-18 | Stop reason: SDUPTHER

## 2024-01-18 RX ORDER — METHYLPHENIDATE HYDROCHLORIDE 30 MG/1
30 CAPSULE, EXTENDED RELEASE ORAL EVERY MORNING
Qty: 30 CAPSULE | Refills: 0 | Status: SHIPPED | OUTPATIENT
Start: 2024-03-30 | End: 2024-04-18 | Stop reason: SDUPTHER

## 2024-01-18 RX ORDER — METHYLPHENIDATE HYDROCHLORIDE 30 MG/1
30 CAPSULE, EXTENDED RELEASE ORAL EVERY MORNING
Qty: 30 CAPSULE | Refills: 0 | Status: SHIPPED | OUTPATIENT
Start: 2024-02-02 | End: 2024-04-18 | Stop reason: SDUPTHER

## 2024-01-18 ASSESSMENT — PATIENT HEALTH QUESTIONNAIRE - PHQ9
SUM OF ALL RESPONSES TO PHQ9 QUESTIONS 1 AND 2: 0
1. LITTLE INTEREST OR PLEASURE IN DOING THINGS: NOT AT ALL
2. FEELING DOWN, DEPRESSED OR HOPELESS: NOT AT ALL

## 2024-01-18 NOTE — PROGRESS NOTES
HPI 49 y.o. female presents for evaluation and refill of controlled substance.      Chronic pain due to lumbar arthritis.  Uses 1/2-1 Norco daily, usually at bedtime to manage pain.    ADD is managed with Metadate CD 30 mg daily.  She takes this medication most days of the week.    Past Medical History:   Diagnosis Date    Acute upper respiratory infection 2024    Anxiety disorder, unspecified 10/21/2019    Anxiety and depression    Complete rotator cuff tear or rupture of right shoulder, not specified as traumatic 12/15/2017    Complete tear of right rotator cuff    COVID-19     COVID-19 virus infection    Disease due to severe acute respiratory syndrome coronavirus 2 (SARS-CoV-2) 2024    Comment on above: 2019. Positive antibodies 2021;    Loose stools 2024    Metabolic syndrome 2018    Syndrome X, metabolic    Personal history of other medical treatment     History of mammogram      Past Surgical History:   Procedure Laterality Date     SECTION, CLASSIC  2014     Section    COLONOSCOPY  2018    Complete Colonoscopy     No family history on file.   Social History     Socioeconomic History    Marital status:      Spouse name: Not on file    Number of children: Not on file    Years of education: Not on file    Highest education level: Not on file   Occupational History    Not on file   Tobacco Use    Smoking status: Some Days     Types: Cigarettes    Smokeless tobacco: Never   Substance and Sexual Activity    Alcohol use: Never    Drug use: Yes     Types: Hydrocodone, Methylphenidate    Sexual activity: Not on file   Other Topics Concern    Not on file   Social History Narrative    Not on file     Social Determinants of Health     Financial Resource Strain: Not on file   Food Insecurity: Not on file   Transportation Needs: Not on file   Physical Activity: Not on file   Stress: Not on file   Social Connections: Not on file   Intimate Partner  Violence: Not on file   Housing Stability: Not on file       Current Outpatient Medications on File Prior to Visit   Medication Sig Dispense Refill    ascorbic acid (Vitamin C) 500 mg tablet Take 1 tablet (500 mg) by mouth once daily.      b complex 0.4 mg tablet Take 1 tablet by mouth once daily.      calcium carbonate-vit D3-min 600 mg calcium- 400 unit tablet Take 1 tablet by mouth in the morning and 1 tablet before bedtime.      celecoxib (CeleBREX) 100 mg capsule Take 1 capsule (100 mg) by mouth 2 times a day. 180 capsule 2    cholecalciferol (Vitamin D3) 25 MCG (1000 UT) capsule Take 1 capsule (25 mcg) by mouth once daily.      cyclobenzaprine (Flexeril) 10 mg tablet Take 1 tablet (10 mg) by mouth 3 times a day as needed for muscle spasms. 60 tablet 2    fluticasone (Flonase) 50 mcg/actuation nasal spray Administer 2 sprays into each nostril once daily. 16 g 3    HYDROcodone-acetaminophen (Vicodin) 5-300 mg tablet Take 1 tablet by mouth once daily. TAKE 1/2 TO 1 TABLET BY MOUTH DAILY AS NEEDED FOR PAIN Do not start before December 31, 2023. 30 tablet 0    HYDROcodone-acetaminophen (Vicodin) 5-300 mg tablet Take 1 tablet by mouth once daily. TAKE 1/2 TO 1 TABLET BY MOUTH DAILY AS NEEDED FOR PAIN Do not start before November 5, 2023. 30 tablet 0    HYDROcodone-acetaminophen (Vicodin) 5-300 mg tablet Take 1 tablet by mouth once daily. TAKE 1/2 TO 1 TABLET BY MOUTH DAILY AS NEEDED FOR PAIN Do not start before December 3, 2023. 30 tablet 0    iron bis-glycinat/vit C/FA/B12 (GENTLE IRON ORAL) Take by mouth.      methylphenidate CD (Metadate CD) 30 mg daily capsule Take 1 capsule (30 mg) by mouth once daily in the morning. Do not start before November 5, 2023. 30 capsule 0    methylphenidate CD (Metadate CD) 30 mg daily capsule Take 1 capsule (30 mg) by mouth once daily in the morning. Do not start before December 3, 2023. 30 capsule 0    methylphenidate CD (Metadate CD) 30 mg daily capsule Take 1 capsule (30 mg) by  "mouth once daily in the morning. Do not start before December 31, 2023. 30 capsule 0    naloxone (Narcan) 4 mg/0.1 mL nasal spray Administer into affected nostril(s). 1 actuation in one nostril x 1, may reat dose q2-3 mins until responsive or EMS arrives      zinc gluconate 100 mg tablet Take by mouth.      ergocalciferol (Vitamin D-2) 1.25 MG (64119 UT) capsule Take 1 capsule (50,000 Units) by mouth 1 (one) time per week. (Patient not taking: Reported on 1/15/2024) 12 capsule 3     No current facility-administered medications on file prior to visit.       No Known Allergies    Visit Vitals  /80   Pulse 80   Ht 1.658 m (5' 5.26\")   Wt 60.8 kg (134 lb)   LMP 12/04/2023 (Approximate)   SpO2 98%   BMI 22.12 kg/m²   Smoking Status Some Days   BSA 1.67 m²        EXAM:  Alert and oriented ×3.  No acute distress.  No tremors noted.  Gait is normal.  Mood and affect are normal.     Assessment/Diagnosis  1. Spondylosis of lumbar region without myelopathy or radiculopathy  - HYDROcodone-acetaminophen (Vicodin) 5-300 mg tablet; Take 1 tablet by mouth once daily. TAKE 1/2 TO 1 TABLET BY MOUTH DAILY AS NEEDED FOR PAIN Do not start before February 2, 2024.  Dispense: 30 tablet; Refill: 0  - HYDROcodone-acetaminophen (Vicodin) 5-300 mg tablet; Take 1 tablet by mouth once daily. TAKE 1/2 TO 1 TABLET BY MOUTH DAILY AS NEEDED FOR PAIN Do not start before March 1, 2024.  Dispense: 30 tablet; Refill: 0  - HYDROcodone-acetaminophen (Vicodin) 5-300 mg tablet; Take 1 tablet by mouth once daily. TAKE 1/2 TO 1 TABLET BY MOUTH DAILY AS NEEDED FOR PAIN Do not start before March 30, 2024.  Dispense: 30 tablet; Refill: 0    2. Chronic narcotic use    3. Attention deficit disorder (ADD) without hyperactivity  - methylphenidate CD (Metadate CD) 30 mg daily capsule; Take 1 capsule (30 mg) by mouth once daily in the morning. Do not start before February 2, 2024.  Dispense: 30 capsule; Refill: 0  - methylphenidate CD (Metadate CD) 30 mg daily " capsule; Take 1 capsule (30 mg) by mouth once daily in the morning. Do not start before March 1, 2024.  Dispense: 30 capsule; Refill: 0  - methylphenidate CD (Metadate CD) 30 mg daily capsule; Take 1 capsule (30 mg) by mouth once daily in the morning. Do not start before March 30, 2024.  Dispense: 30 capsule; Refill: 0        Plan    OARRS reviewed.  Controlled Substance Agreement is current.  Urine drug screen up to date.    CONTROLLED SUBSTANCE USE:   Patient is aware of Dr. Diaz's and Estefany Darling's practice rules for use of scheduled medication.  Has a signed contract stating that patient will only receive controlled substance prescriptions from Dr. Diaz, will only receive a one month supply, will fill prescriptions at one pharmacy, and agrees to a random urine drug screen.  Patient is aware that she must have an office appointment every 90 days to continue to receive benzodiazepines or narcotics.        Follow up in 3 months for medical management    I will continue to monitor, evaluate, assess and treat all problems/diagnoses as appropriate and continue to collaborate with specialists.    Contact office or send a  [facility] message with any questions or concerns    Patient will only be notified of labs that require medical intervention.    Prescriptions will not be filled unless you are compliant with your follow up appointments or have a follow up appointment scheduled as per instruction of your physician. Refills should be requested at the time of your visit.    **Charting was completed using voice recognition technology and may include unintended errors**    Gary Diaz DO, FACOFP  Senior Attending Physician  [facility]  [address], [address]  [place] [address] [phone]

## 2024-01-26 ENCOUNTER — TELEPHONE (OUTPATIENT)
Dept: PRIMARY CARE | Facility: CLINIC | Age: 49
End: 2024-01-26
Payer: COMMERCIAL

## 2024-01-26 NOTE — TELEPHONE ENCOUNTER
Dr. Diaz pt we performed PAP.    Please notify the pt of normal PAP and neg HPV, repeat in 5 years.    Thank you,  Kerri Nava, DO

## 2024-04-18 ENCOUNTER — OFFICE VISIT (OUTPATIENT)
Dept: PRIMARY CARE | Facility: CLINIC | Age: 49
End: 2024-04-18
Payer: COMMERCIAL

## 2024-04-18 VITALS
WEIGHT: 139 LBS | BODY MASS INDEX: 25.58 KG/M2 | SYSTOLIC BLOOD PRESSURE: 115 MMHG | DIASTOLIC BLOOD PRESSURE: 81 MMHG | HEART RATE: 68 BPM | OXYGEN SATURATION: 98 % | HEIGHT: 62 IN

## 2024-04-18 DIAGNOSIS — J30.1 SEASONAL ALLERGIC RHINITIS DUE TO POLLEN: ICD-10-CM

## 2024-04-18 DIAGNOSIS — F11.90 CHRONIC NARCOTIC USE: ICD-10-CM

## 2024-04-18 DIAGNOSIS — E34.9 HORMONE IMBALANCE: Primary | ICD-10-CM

## 2024-04-18 DIAGNOSIS — Z02.83 ENCOUNTER FOR DRUG SCREENING: ICD-10-CM

## 2024-04-18 DIAGNOSIS — Z51.81 ENCOUNTER FOR THERAPEUTIC DRUG LEVEL MONITORING: ICD-10-CM

## 2024-04-18 DIAGNOSIS — F98.8 ATTENTION DEFICIT DISORDER (ADD) WITHOUT HYPERACTIVITY: ICD-10-CM

## 2024-04-18 DIAGNOSIS — Z79.890 HORMONE REPLACEMENT THERAPY: ICD-10-CM

## 2024-04-18 DIAGNOSIS — M47.816 SPONDYLOSIS OF LUMBAR REGION WITHOUT MYELOPATHY OR RADICULOPATHY: ICD-10-CM

## 2024-04-18 DIAGNOSIS — G89.29 OTHER CHRONIC PAIN: ICD-10-CM

## 2024-04-18 DIAGNOSIS — E55.9 VITAMIN D DEFICIENCY: ICD-10-CM

## 2024-04-18 PROCEDURE — 99214 OFFICE O/P EST MOD 30 MIN: CPT | Performed by: FAMILY MEDICINE

## 2024-04-18 NOTE — PROGRESS NOTES
General Medical Management Note    49 y.o. female presents for Medical Management  HPI  Menses - PAP in .  Last menses .  Joints hurting more but she is working more.  No significant vasomotor symptoms.  Inquiring about blood tests for menopause.    Chronic pain due to DJD:  using Vicodin daily.  Last dose last night.  30 pills lasts 30 days.  Continues to work full time at [employer]    ADD managed with Ritalin ER 30mg once daily.   Last dose earlier today.  Denies side effects.    Allergies managed with OTC medications    Health body weight due to appropriate diet and being physically acitve.      Past Medical History:   Diagnosis Date    Complete rotator cuff tear or rupture of right shoulder, not specified as traumatic 12/15/2017    Complete tear of right rotator cuff    Disease due to severe acute respiratory syndrome coronavirus 2 (SARS-CoV-2) 2024    Comment on above: 2019. Positive antibodies 2021;      Past Surgical History:   Procedure Laterality Date     SECTION, CLASSIC  2014     Section    COLONOSCOPY  2018    Complete Colonoscopy     No family history on file.   Social History     Socioeconomic History    Marital status:      Spouse name: Not on file    Number of children: Not on file    Years of education: Not on file    Highest education level: Not on file   Occupational History    Not on file   Tobacco Use    Smoking status: Some Days     Types: Cigarettes    Smokeless tobacco: Never   Substance and Sexual Activity    Alcohol use: Never    Drug use: Yes     Types: Hydrocodone, Methylphenidate    Sexual activity: Not on file   Other Topics Concern    Not on file   Social History Narrative    Not on file     Social Determinants of Health     Financial Resource Strain: Not on file   Food Insecurity: Not on file   Transportation Needs: Not on file   Physical Activity: Not on file   Stress: Not on file   Social Connections: Not on file    Intimate Partner Violence: Not on file   Housing Stability: Not on file       Current Outpatient Medications on File Prior to Visit   Medication Sig Dispense Refill    ascorbic acid (Vitamin C) 500 mg tablet Take 1 tablet (500 mg) by mouth once daily.      b complex 0.4 mg tablet Take 1 tablet by mouth once daily.      calcium carbonate-vit D3-min 600 mg calcium- 400 unit tablet Take 1 tablet by mouth in the morning and 1 tablet before bedtime.      celecoxib (CeleBREX) 100 mg capsule Take 1 capsule (100 mg) by mouth 2 times a day. (Patient taking differently: Take 1 capsule (100 mg) by mouth once daily.) 180 capsule 2    cholecalciferol (Vitamin D3) 25 MCG (1000 UT) capsule Take 1 capsule (25 mcg) by mouth once daily.      cyclobenzaprine (Flexeril) 10 mg tablet Take 1 tablet (10 mg) by mouth 3 times a day as needed for muscle spasms. 60 tablet 2    fluticasone (Flonase) 50 mcg/actuation nasal spray Administer 2 sprays into each nostril once daily. 16 g 3    HYDROcodone-acetaminophen (Vicodin) 5-300 mg tablet Take 1 tablet by mouth once daily. TAKE 1/2 TO 1 TABLET BY MOUTH DAILY AS NEEDED FOR PAIN Do not start before February 2, 2024. 30 tablet 0    HYDROcodone-acetaminophen (Vicodin) 5-300 mg tablet Take 1 tablet by mouth once daily. TAKE 1/2 TO 1 TABLET BY MOUTH DAILY AS NEEDED FOR PAIN Do not start before March 1, 2024. 30 tablet 0    HYDROcodone-acetaminophen (Vicodin) 5-300 mg tablet Take 1 tablet by mouth once daily. TAKE 1/2 TO 1 TABLET BY MOUTH DAILY AS NEEDED FOR PAIN Do not start before March 30, 2024. 30 tablet 0    methylphenidate CD (Metadate CD) 30 mg daily capsule Take 1 capsule (30 mg) by mouth once daily in the morning. Do not start before February 2, 2024. 30 capsule 0    methylphenidate CD (Metadate CD) 30 mg daily capsule Take 1 capsule (30 mg) by mouth once daily in the morning. Do not start before March 1, 2024. 30 capsule 0    methylphenidate CD (Metadate CD) 30 mg daily capsule Take 1  "capsule (30 mg) by mouth once daily in the morning. Do not start before March 30, 2024. 30 capsule 0    naloxone (Narcan) 4 mg/0.1 mL nasal spray Administer into affected nostril(s). 1 actuation in one nostril x 1, may reat dose q2-3 mins until responsive or EMS arrives      zinc gluconate 100 mg tablet Take by mouth.      ergocalciferol (Vitamin D-2) 1.25 MG (17999 UT) capsule Take 1 capsule (50,000 Units) by mouth 1 (one) time per week. (Patient not taking: Reported on 1/15/2024) 12 capsule 3    iron bis-glycinat/vit C/FA/B12 (GENTLE IRON ORAL) Take by mouth.       No current facility-administered medications on file prior to visit.       No Known Allergies      ROS: Denies chest pain, SOB, Headache, GI problems     Visit Vitals  /81   Pulse 68   Ht 1.581 m (5' 2.25\")   Wt 63 kg (139 lb)   SpO2 98%   BMI 25.22 kg/m²   Smoking Status Some Days   BSA 1.66 m²        PHYSICAL EXAM:  Alert and oriented x3.  Eyes: EOM grossly intact  Neck supple without lymph adenopathy or carotid bruit.  No masses or thyromegaly  Heart regular rate and rhythm without murmur.  Lungs clear to auscultation.  Legs without edema.  Gait is non-antalgic  Speech clear.  Hearing adequate.          DIAGNOSIS/PLAN:  1. Encounter for therapeutic drug level monitoring  - Opiate/Opioid/Benzo Prescription Compliance; Future  - Methylphenidate And Metabolite,Conf,Urine; Future    2. Encounter for drug screening  - Opiate/Opioid/Benzo Prescription Compliance; Future  - Methylphenidate And Metabolite,Conf,Urine; Future    3. Chronic narcotic use  - Comprehensive Metabolic Panel; Future    4. Hormone imbalance  - DHEA; Future  - Estradiol; Future  - Estriol; Future  - Estrone; Future  - Progesterone; Future  - Testosterone; Future    5. Hormone replacement therapy  - DHEA; Future  - Estradiol; Future  - Estriol; Future  - Estrone; Future  - Progesterone; Future  - Testosterone; Future    6. Vitamin D deficiency  - Vitamin D 25-Hydroxy,Total (for " eval of Vitamin D levels); Future    7. DJD chronic pain  - celecoxib (CeleBREX) 100 mg capsule; Take 1 capsule (100 mg) by mouth 2 times a day.  Dispense: 180 capsule; Refill: 2    8. Seasonal allergic rhinitis due to pollen  - fluticasone (Flonase) 50 mcg/actuation nasal spray; Administer 2 sprays into each nostril once daily.  Dispense: 16 g; Refill: 3    9. Spondylosis of lumbar region without myelopathy or radiculopathy  - HYDROcodone-acetaminophen (Vicodin) 5-300 mg tablet; Take 1 tablet by mouth once daily. TAKE 1/2 TO 1 TABLET BY MOUTH DAILY AS NEEDED FOR PAIN Do not start before April 29, 2024.  Dispense: 30 tablet; Refill: 0  - HYDROcodone-acetaminophen (Vicodin) 5-300 mg tablet; Take 1 tablet by mouth once daily. TAKE 1/2 TO 1 TABLET BY MOUTH DAILY AS NEEDED FOR PAIN Do not start before May 28, 2024.  Dispense: 30 tablet; Refill: 0  - HYDROcodone-acetaminophen (Vicodin) 5-300 mg tablet; Take 1 tablet by mouth once daily. TAKE 1/2 TO 1 TABLET BY MOUTH DAILY AS NEEDED FOR PAIN Do not start before June 27, 2024.  Dispense: 30 tablet; Refill: 0    10. Attention deficit disorder (ADD) without hyperactivity  - methylphenidate CD (Metadate CD) 30 mg daily capsule; Take 1 capsule (30 mg) by mouth once daily in the morning. Do not start before April 29, 2024.  Dispense: 30 capsule; Refill: 0  - methylphenidate CD (Metadate CD) 30 mg daily capsule; Take 1 capsule (30 mg) by mouth once daily in the morning. Do not start before May 28, 2024.  Dispense: 30 capsule; Refill: 0  - methylphenidate CD (Metadate CD) 30 mg daily capsule; Take 1 capsule (30 mg) by mouth once daily in the morning. Do not start before June 27, 2024.  Dispense: 30 capsule; Refill: 0        Follow up in 3 months for pain management;  6 months for annual physical exam    I will continue to monitor, evaluate, assess and treat all problems/diagnoses as appropriate and continue to collaborate with specialists.    Contact office or [facility]  [facility] with any questions or concerns    Encouraged to sign up with [facility]  Patient will only be notified of labs that require medical intervention.    Prescriptions will not be filled unless you are compliant with your follow up appointments or have a follow up appointment scheduled as per instruction of your physician. Refills should be requested at the time of your visit.    **Charting was completed using voice recognition technology and may include unintended errors**    Gary Diaz DO, FACOFP  Senior Attending Physician  [facility]  [address]  [place] [address] [phone]    Gary Diaz DO, FACOFP

## 2024-04-22 ENCOUNTER — LAB (OUTPATIENT)
Dept: LAB | Facility: LAB | Age: 49
End: 2024-04-22
Payer: COMMERCIAL

## 2024-04-22 DIAGNOSIS — Z02.83 ENCOUNTER FOR DRUG SCREENING: ICD-10-CM

## 2024-04-22 DIAGNOSIS — Z79.890 HORMONE REPLACEMENT THERAPY: ICD-10-CM

## 2024-04-22 DIAGNOSIS — Z51.81 ENCOUNTER FOR THERAPEUTIC DRUG LEVEL MONITORING: ICD-10-CM

## 2024-04-22 DIAGNOSIS — E34.9 HORMONE IMBALANCE: ICD-10-CM

## 2024-04-22 DIAGNOSIS — E55.9 VITAMIN D DEFICIENCY: ICD-10-CM

## 2024-04-22 DIAGNOSIS — F11.90 CHRONIC NARCOTIC USE: ICD-10-CM

## 2024-04-22 LAB
25(OH)D3 SERPL-MCNC: 29 NG/ML (ref 30–100)
ALBUMIN SERPL BCP-MCNC: 4 G/DL (ref 3.4–5)
ALP SERPL-CCNC: 62 U/L (ref 33–110)
ALT SERPL W P-5'-P-CCNC: 10 U/L (ref 7–45)
AMPHETAMINES UR QL SCN: NORMAL
ANION GAP SERPL CALC-SCNC: 9 MMOL/L (ref 10–20)
AST SERPL W P-5'-P-CCNC: 14 U/L (ref 9–39)
BARBITURATES UR QL SCN: NORMAL
BILIRUB SERPL-MCNC: 0.4 MG/DL (ref 0–1.2)
BUN SERPL-MCNC: 14 MG/DL (ref 6–23)
BZE UR QL SCN: NORMAL
CALCIUM SERPL-MCNC: 9 MG/DL (ref 8.6–10.3)
CANNABINOIDS UR QL SCN: NORMAL
CHLORIDE SERPL-SCNC: 105 MMOL/L (ref 98–107)
CO2 SERPL-SCNC: 28 MMOL/L (ref 21–32)
CREAT SERPL-MCNC: 0.53 MG/DL (ref 0.5–1.05)
CREAT UR-MCNC: 25.5 MG/DL (ref 20–320)
EGFRCR SERPLBLD CKD-EPI 2021: >90 ML/MIN/1.73M*2
ESTRADIOL SERPL-MCNC: 45 PG/ML
GLUCOSE SERPL-MCNC: 83 MG/DL (ref 74–99)
PCP UR QL SCN: NORMAL
POTASSIUM SERPL-SCNC: 4.4 MMOL/L (ref 3.5–5.3)
PROGEST SERPL-MCNC: 0.3 NG/ML
PROT SERPL-MCNC: 6.7 G/DL (ref 6.4–8.2)
SODIUM SERPL-SCNC: 138 MMOL/L (ref 136–145)
TESTOST SERPL-MCNC: <30 NG/DL (ref 0–70)

## 2024-04-22 PROCEDURE — 80361 OPIATES 1 OR MORE: CPT

## 2024-04-22 PROCEDURE — 82677 ASSAY OF ESTRIOL: CPT

## 2024-04-22 PROCEDURE — 82306 VITAMIN D 25 HYDROXY: CPT

## 2024-04-22 PROCEDURE — 82670 ASSAY OF TOTAL ESTRADIOL: CPT

## 2024-04-22 PROCEDURE — 80360 METHYLPHENIDATE: CPT

## 2024-04-22 PROCEDURE — 80358 DRUG SCREENING METHADONE: CPT

## 2024-04-22 PROCEDURE — 36415 COLL VENOUS BLD VENIPUNCTURE: CPT

## 2024-04-22 PROCEDURE — 80053 COMPREHEN METABOLIC PANEL: CPT

## 2024-04-22 PROCEDURE — 84403 ASSAY OF TOTAL TESTOSTERONE: CPT

## 2024-04-22 PROCEDURE — 80354 DRUG SCREENING FENTANYL: CPT

## 2024-04-22 PROCEDURE — 82570 ASSAY OF URINE CREATININE: CPT

## 2024-04-22 PROCEDURE — 80365 DRUG SCREENING OXYCODONE: CPT

## 2024-04-22 PROCEDURE — 82626 DEHYDROEPIANDROSTERONE: CPT

## 2024-04-22 PROCEDURE — 84144 ASSAY OF PROGESTERONE: CPT

## 2024-04-22 PROCEDURE — 82679 ASSAY OF ESTRONE: CPT

## 2024-04-22 PROCEDURE — 80368 SEDATIVE HYPNOTICS: CPT

## 2024-04-22 PROCEDURE — 80346 BENZODIAZEPINES1-12: CPT

## 2024-04-22 PROCEDURE — 80373 DRUG SCREENING TRAMADOL: CPT

## 2024-04-22 PROCEDURE — 80307 DRUG TEST PRSMV CHEM ANLYZR: CPT

## 2024-04-22 RX ORDER — CELECOXIB 100 MG/1
100 CAPSULE ORAL 2 TIMES DAILY
Qty: 180 CAPSULE | Refills: 2 | Status: SHIPPED | OUTPATIENT
Start: 2024-04-22

## 2024-04-22 RX ORDER — METHYLPHENIDATE HYDROCHLORIDE 30 MG/1
30 CAPSULE, EXTENDED RELEASE ORAL EVERY MORNING
Qty: 30 CAPSULE | Refills: 0 | Status: SHIPPED | OUTPATIENT
Start: 2024-05-28

## 2024-04-22 RX ORDER — METHYLPHENIDATE HYDROCHLORIDE 30 MG/1
30 CAPSULE, EXTENDED RELEASE ORAL EVERY MORNING
Qty: 30 CAPSULE | Refills: 0 | Status: SHIPPED | OUTPATIENT
Start: 2024-04-29

## 2024-04-22 RX ORDER — HYDROCODONE BITARTRATE AND ACETAMINOPHEN 5; 300 MG/1; MG/1
1 TABLET ORAL DAILY
Qty: 30 TABLET | Refills: 0 | Status: SHIPPED | OUTPATIENT
Start: 2024-04-29

## 2024-04-22 RX ORDER — HYDROCODONE BITARTRATE AND ACETAMINOPHEN 5; 300 MG/1; MG/1
1 TABLET ORAL DAILY
Qty: 30 TABLET | Refills: 0 | Status: SHIPPED | OUTPATIENT
Start: 2024-05-28

## 2024-04-22 RX ORDER — METHYLPHENIDATE HYDROCHLORIDE 30 MG/1
30 CAPSULE, EXTENDED RELEASE ORAL EVERY MORNING
Qty: 30 CAPSULE | Refills: 0 | Status: SHIPPED | OUTPATIENT
Start: 2024-06-27

## 2024-04-22 RX ORDER — FLUTICASONE PROPIONATE 50 MCG
2 SPRAY, SUSPENSION (ML) NASAL DAILY
Qty: 16 G | Refills: 3 | Status: SHIPPED | OUTPATIENT
Start: 2024-04-22

## 2024-04-22 RX ORDER — HYDROCODONE BITARTRATE AND ACETAMINOPHEN 5; 300 MG/1; MG/1
1 TABLET ORAL DAILY
Qty: 30 TABLET | Refills: 0 | Status: SHIPPED | OUTPATIENT
Start: 2024-06-27

## 2024-04-25 LAB
1OH-MIDAZOLAM UR CFM-MCNC: <25 NG/ML
6MAM UR CFM-MCNC: <25 NG/ML
7AMINOCLONAZEPAM UR CFM-MCNC: <25 NG/ML
A-OH ALPRAZ UR CFM-MCNC: <25 NG/ML
ALPRAZ UR CFM-MCNC: <25 NG/ML
CHLORDIAZEP UR CFM-MCNC: <25 NG/ML
CLONAZEPAM UR CFM-MCNC: <25 NG/ML
CODEINE UR CFM-MCNC: <50 NG/ML
DHEA SERPL-MCNC: 2.28 NG/ML (ref 0.63–4.7)
DIAZEPAM UR CFM-MCNC: <25 NG/ML
EDDP UR CFM-MCNC: <25 NG/ML
ESTRONE SERPL-MCNC: 22.1 PG/ML
FENTANYL UR CFM-MCNC: <2.5 NG/ML
HYDROCODONE CTO UR CFM-MCNC: 130 NG/ML
HYDROMORPHONE UR CFM-MCNC: 88 NG/ML
LORAZEPAM UR CFM-MCNC: <25 NG/ML
METHADONE UR CFM-MCNC: <25 NG/ML
MIDAZOLAM UR CFM-MCNC: <25 NG/ML
MORPHINE UR CFM-MCNC: <50 NG/ML
NORDIAZEPAM UR CFM-MCNC: <25 NG/ML
NORFENTANYL UR CFM-MCNC: <2.5 NG/ML
NORHYDROCODONE UR CFM-MCNC: 333 NG/ML
NOROXYCODONE UR CFM-MCNC: <25 NG/ML
NORTRAMADOL UR-MCNC: <50 NG/ML
OXAZEPAM UR CFM-MCNC: <25 NG/ML
OXYCODONE UR CFM-MCNC: <25 NG/ML
OXYMORPHONE UR CFM-MCNC: <25 NG/ML
TEMAZEPAM UR CFM-MCNC: <25 NG/ML
TRAMADOL UR CFM-MCNC: <50 NG/ML
ZOLPIDEM UR CFM-MCNC: <25 NG/ML
ZOLPIDEM UR-MCNC: <25 NG/ML

## 2024-04-26 LAB — ESTRIOL SERPL-MCNC: <0.1 NG/ML

## 2024-05-01 LAB
ME-PHENIDATE UR-MCNC: 168 NG/ML
PPAA UR-MCNC: >5000 NG/ML

## 2024-07-18 ENCOUNTER — APPOINTMENT (OUTPATIENT)
Dept: PRIMARY CARE | Facility: CLINIC | Age: 49
End: 2024-07-18
Payer: COMMERCIAL

## 2024-07-18 VITALS
WEIGHT: 140 LBS | DIASTOLIC BLOOD PRESSURE: 81 MMHG | SYSTOLIC BLOOD PRESSURE: 123 MMHG | BODY MASS INDEX: 25.76 KG/M2 | HEIGHT: 62 IN

## 2024-07-18 DIAGNOSIS — M47.816 SPONDYLOSIS OF LUMBAR REGION WITHOUT MYELOPATHY OR RADICULOPATHY: ICD-10-CM

## 2024-07-18 DIAGNOSIS — F98.8 ATTENTION DEFICIT DISORDER (ADD) WITHOUT HYPERACTIVITY: ICD-10-CM

## 2024-07-18 PROCEDURE — 3008F BODY MASS INDEX DOCD: CPT | Performed by: FAMILY MEDICINE

## 2024-07-18 PROCEDURE — 99213 OFFICE O/P EST LOW 20 MIN: CPT | Performed by: FAMILY MEDICINE

## 2024-07-18 RX ORDER — METHYLPHENIDATE HYDROCHLORIDE 30 MG/1
30 CAPSULE, EXTENDED RELEASE ORAL EVERY MORNING
Qty: 30 CAPSULE | Refills: 0 | Status: SHIPPED | OUTPATIENT
Start: 2024-08-22

## 2024-07-18 RX ORDER — METHYLPHENIDATE HYDROCHLORIDE 30 MG/1
30 CAPSULE, EXTENDED RELEASE ORAL EVERY MORNING
Qty: 30 CAPSULE | Refills: 0 | Status: SHIPPED | OUTPATIENT
Start: 2024-09-19

## 2024-07-18 RX ORDER — METHYLPHENIDATE HYDROCHLORIDE 30 MG/1
30 CAPSULE, EXTENDED RELEASE ORAL EVERY MORNING
Qty: 30 CAPSULE | Refills: 0 | Status: SHIPPED | OUTPATIENT
Start: 2024-07-25

## 2024-07-18 RX ORDER — HYDROCODONE BITARTRATE AND ACETAMINOPHEN 5; 300 MG/1; MG/1
1 TABLET ORAL DAILY
Qty: 30 TABLET | Refills: 0 | Status: SHIPPED | OUTPATIENT
Start: 2024-07-25

## 2024-07-18 RX ORDER — HYDROCODONE BITARTRATE AND ACETAMINOPHEN 5; 300 MG/1; MG/1
1 TABLET ORAL DAILY
Qty: 30 TABLET | Refills: 0 | Status: SHIPPED | OUTPATIENT
Start: 2024-09-19

## 2024-07-18 RX ORDER — HYDROCODONE BITARTRATE AND ACETAMINOPHEN 5; 300 MG/1; MG/1
1 TABLET ORAL DAILY
Qty: 30 TABLET | Refills: 0 | Status: SHIPPED | OUTPATIENT
Start: 2024-08-22

## 2024-07-18 NOTE — PROGRESS NOTES
HPI 49 y.o. female presents for evaluation and refill of controlled substance.      ADD: Patient continues to benefit from Metadate CD30 milligram capsule daily.  Improved focus, concentration and ability to complete tasks.    Chronic low back pain is managed effectively with 1 Vicodin daily.    Patient last dose of each medication was within 24 hours.    Past Medical History:   Diagnosis Date    Complete rotator cuff tear or rupture of right shoulder, not specified as traumatic 12/15/2017    Complete tear of right rotator cuff    Disease due to severe acute respiratory syndrome coronavirus 2 (SARS-CoV-2) 2024    Comment on above: 2019. Positive antibodies 2021;      Past Surgical History:   Procedure Laterality Date     SECTION, CLASSIC  2014     Section    COLONOSCOPY  2018    Complete Colonoscopy     No family history on file.   Social History     Socioeconomic History    Marital status:      Spouse name: Not on file    Number of children: Not on file    Years of education: Not on file    Highest education level: Not on file   Occupational History    Not on file   Tobacco Use    Smoking status: Some Days     Types: Cigarettes    Smokeless tobacco: Never   Substance and Sexual Activity    Alcohol use: Never    Drug use: Yes     Types: Hydrocodone, Methylphenidate    Sexual activity: Not on file   Other Topics Concern    Not on file   Social History Narrative    Not on file     Social Determinants of Health     Financial Resource Strain: Not on file   Food Insecurity: Not on file   Transportation Needs: Not on file   Physical Activity: Not on file   Stress: Not on file   Social Connections: Not on file   Intimate Partner Violence: Not on file   Housing Stability: Not on file       Current Outpatient Medications on File Prior to Visit   Medication Sig Dispense Refill    ascorbic acid (Vitamin C) 500 mg tablet Take 1 tablet (500 mg) by mouth once daily.      b  complex 0.4 mg tablet Take 1 tablet by mouth once daily.      calcium carbonate-vit D3-min 600 mg calcium- 400 unit tablet Take 1 tablet by mouth in the morning and 1 tablet before bedtime.      celecoxib (CeleBREX) 100 mg capsule Take 1 capsule (100 mg) by mouth 2 times a day. 180 capsule 2    cholecalciferol (Vitamin D3) 25 MCG (1000 UT) capsule Take 1 capsule (25 mcg) by mouth once daily.      cyclobenzaprine (Flexeril) 10 mg tablet Take 1 tablet (10 mg) by mouth 3 times a day as needed for muscle spasms. 60 tablet 2    fluticasone (Flonase) 50 mcg/actuation nasal spray Administer 2 sprays into each nostril once daily. 16 g 3    HYDROcodone-acetaminophen (Vicodin) 5-300 mg tablet Take 1 tablet by mouth once daily. TAKE 1/2 TO 1 TABLET BY MOUTH DAILY AS NEEDED FOR PAIN Do not start before April 29, 2024. 30 tablet 0    HYDROcodone-acetaminophen (Vicodin) 5-300 mg tablet Take 1 tablet by mouth once daily. TAKE 1/2 TO 1 TABLET BY MOUTH DAILY AS NEEDED FOR PAIN Do not start before May 28, 2024. 30 tablet 0    HYDROcodone-acetaminophen (Vicodin) 5-300 mg tablet Take 1 tablet by mouth once daily. TAKE 1/2 TO 1 TABLET BY MOUTH DAILY AS NEEDED FOR PAIN Do not start before June 27, 2024. 30 tablet 0    iron bis-glycinat/vit C/FA/B12 (GENTLE IRON ORAL) Take by mouth.      methylphenidate CD (Metadate CD) 30 mg daily capsule Take 1 capsule (30 mg) by mouth once daily in the morning. Do not start before April 29, 2024. 30 capsule 0    methylphenidate CD (Metadate CD) 30 mg daily capsule Take 1 capsule (30 mg) by mouth once daily in the morning. Do not start before May 28, 2024. 30 capsule 0    methylphenidate CD (Metadate CD) 30 mg daily capsule Take 1 capsule (30 mg) by mouth once daily in the morning. Do not start before June 27, 2024. 30 capsule 0    naloxone (Narcan) 4 mg/0.1 mL nasal spray Administer into affected nostril(s). 1 actuation in one nostril x 1, may reat dose q2-3 mins until responsive or EMS arrives       "zinc gluconate 100 mg tablet Take by mouth.      ergocalciferol (Vitamin D-2) 1.25 MG (84801 UT) capsule Take 1 capsule (50,000 Units) by mouth 1 (one) time per week. (Patient not taking: Reported on 1/15/2024) 12 capsule 3     No current facility-administered medications on file prior to visit.       No Known Allergies    Visit Vitals  /81   Ht 1.581 m (5' 2.25\")   Wt 63.5 kg (140 lb)   BMI 25.40 kg/m²   Smoking Status Some Days   BSA 1.67 m²        EXAM:  Alert and oriented ×3.  No acute distress.  No tremors noted.  Gait is normal.  Mood and affect are normal.     Assessment/Diagnosis  1. Spondylosis of lumbar region without myelopathy or radiculopathy    - HYDROcodone-acetaminophen (Vicodin) 5-300 mg tablet; Take 1 tablet by mouth once daily. TAKE 1/2 TO 1 TABLET BY MOUTH DAILY AS NEEDED FOR PAIN Do not fill before July 25, 2024.  Dispense: 30 tablet; Refill: 0  - HYDROcodone-acetaminophen (Vicodin) 5-300 mg tablet; Take 1 tablet by mouth once daily. TAKE 1/2 TO 1 TABLET BY MOUTH DAILY AS NEEDED FOR PAIN Do not fill before August 22, 2024.  Dispense: 30 tablet; Refill: 0  - HYDROcodone-acetaminophen (Vicodin) 5-300 mg tablet; Take 1 tablet by mouth once daily. TAKE 1/2 TO 1 TABLET BY MOUTH DAILY AS NEEDED FOR PAIN Do not fill before September 19, 2024.  Dispense: 30 tablet; Refill: 0    2. Attention deficit disorder (ADD) without hyperactivity    - methylphenidate CD (Metadate CD) 30 mg daily capsule; Take 1 capsule (30 mg) by mouth once daily in the morning. Do not fill before July 25, 2024.  Dispense: 30 capsule; Refill: 0  - methylphenidate CD (Metadate CD) 30 mg daily capsule; Take 1 capsule (30 mg) by mouth once daily in the morning. Do not fill before August 22, 2024.  Dispense: 30 capsule; Refill: 0  - methylphenidate CD (Metadate CD) 30 mg daily capsule; Take 1 capsule (30 mg) by mouth once daily in the morning. Do not fill before September 19, 2024.  Dispense: 30 capsule; Refill: " 0        Plan    OARRS reviewed.  Controlled Substance Agreement is current.  Urine drug screen up to date.    CONTROLLED SUBSTANCE USE:   Patient is aware of Dr. Diaz's and Estefany Darling's practice rules for use of scheduled medication.  Has a signed contract stating that patient will only receive controlled substance prescriptions from Dr. Diaz, will only receive a one month supply, will fill prescriptions at one pharmacy, and agrees to a random urine drug screen.  Patient is aware that she must have an office appointment every 90 days to continue to receive benzodiazepines or narcotics.        Follow up in 3 months for medical management  I will continue to monitor, evaluate, assess and treat all problems/diagnoses as appropriate and continue to collaborate with specialists.    Contact office or send a  [facility] message with any questions or concerns    Patient will only be notified of labs that require medical intervention.    Prescriptions will not be filled unless you are compliant with your follow up appointments or have a follow up appointment scheduled as per instruction of your physician. Refills should be requested at the time of your visit.    **Charting was completed using voice recognition technology and may include unintended errors**    Gary Diaz DO, FACOFP  Senior Attending Physician  [facility]  [address]  [place] [address] [phone]

## 2024-08-19 ENCOUNTER — APPOINTMENT (OUTPATIENT)
Dept: PRIMARY CARE | Facility: CLINIC | Age: 49
End: 2024-08-19
Payer: COMMERCIAL

## 2024-08-19 VITALS
DIASTOLIC BLOOD PRESSURE: 78 MMHG | BODY MASS INDEX: 25.03 KG/M2 | WEIGHT: 136 LBS | HEART RATE: 102 BPM | OXYGEN SATURATION: 98 % | HEIGHT: 62 IN | SYSTOLIC BLOOD PRESSURE: 115 MMHG

## 2024-08-19 DIAGNOSIS — Z79.890 HORMONE REPLACEMENT THERAPY: Primary | ICD-10-CM

## 2024-08-19 DIAGNOSIS — E34.9 HORMONE IMBALANCE: ICD-10-CM

## 2024-08-19 DIAGNOSIS — N92.4 MENORRHAGIA, PREMENOPAUSAL: ICD-10-CM

## 2024-08-19 PROCEDURE — 99214 OFFICE O/P EST MOD 30 MIN: CPT | Performed by: FAMILY MEDICINE

## 2024-08-19 PROCEDURE — 3008F BODY MASS INDEX DOCD: CPT | Performed by: FAMILY MEDICINE

## 2024-08-19 RX ORDER — NORGESTIMATE AND ETHINYL ESTRADIOL 0.25-0.035
1 KIT ORAL DAILY
Qty: 28 TABLET | Refills: 12 | Status: SHIPPED | OUTPATIENT
Start: 2024-08-19 | End: 2025-08-19

## 2024-08-19 NOTE — PROGRESS NOTES
"Subjective     Patient ID: [id number]     Loan Colorado is a 49 y.o. female who presents for Discuss HRT.    HPI    Pt is interest in starting hormone  replacement therapy. Labs completed in April of this year for evaluation of pre-menopausal symptoms. Pt is currently having her menses. Her menses typically last 7 days. Pt states that a few days ago her cycle was  heavy. She had to wear a tampon and an absorbant pad. Pt states that she if fatigue and had less energy than normal. Pt has had cramps that she rates a 10/10. Pt has taken Vicodin at bedtime, ice pk to abdomen, and a sticky heating pad lower back. Pt also tried tumeric and margie shots for inflammation.      Objective   /78   Pulse 102   Ht 1.581 m (5' 2.25\")   Wt 61.7 kg (136 lb)   SpO2 98%   BMI 24.68 kg/m²      Physical Exam:     -A&O times 3. No acute distress. No tremors noted. Gait is normal.     Assessment/Plan   1. Hormone replacement therapy  - norgestimate-ethinyl estradioL (Ortho-Cyclen) 0.25-35 mg-mcg tablet; Take 1 tablet by mouth once daily.  Dispense: 28 tablet; Refill: 12    2. Hormone imbalance  - norgestimate-ethinyl estradioL (Ortho-Cyclen) 0.25-35 mg-mcg tablet; Take 1 tablet by mouth once daily.  Dispense: 28 tablet; Refill: 12    3. Menorrhagia, premenopausal  -Discussed oral contraceptive agent, compounded hormone replacement therapy pharmacist consultation, hysterectomy.  Patient had questions about several nutritional supplements that she had been reading about such as NAD.  She has been taking iron tablets due to potential for anemia from last week's menstrual period.  She feels exhausted.    Return to office in 3 months for comprehensive medical evaluation, long-term medication use monitoring, and preventative services screening    We will continue to monitor, evaluate, assess and treat all problems/diagnoses as appropriate and continue to collaborate with specialists.    Encouraged to sign up with " Rehan    Contact office or send a  [facility] message with any questions or concerns    Patient will only be notified of labs that require medical intervention.    Prescriptions will not be filled unless you are compliant with your follow up appointments or have a follow up appointment scheduled as per instruction of your physician. Refills should be requested at the time of your visit.    **Charting was completed using voice recognition technology and may include unintended errors**    Documentation in part by Zoraida Mendoza RN, NP student, [facility]    I saw and evaluated the patient. I personally obtained the key and critical portions of the history and physical exam or was physically present for key and critical portions performed by the nurse practitioner student. I reviewed the documentation and discussed the patient with the nurse practitioner student.  I was directly involved with the history, exam and medical decision making and agree with the medical decision making as documented in the note.    Gary Diaz DO, [facility]  [address]  [place] [address] [phone]  Zoraida Mendoza

## 2024-08-30 DIAGNOSIS — H54.7 VISION IMPAIRMENT: Primary | ICD-10-CM

## 2024-10-16 ENCOUNTER — TELEPHONE (OUTPATIENT)
Dept: PRIMARY CARE | Facility: CLINIC | Age: 49
End: 2024-10-16
Payer: COMMERCIAL

## 2024-10-16 DIAGNOSIS — F98.8 ATTENTION DEFICIT DISORDER (ADD) WITHOUT HYPERACTIVITY: ICD-10-CM

## 2024-10-16 DIAGNOSIS — M47.816 SPONDYLOSIS OF LUMBAR REGION WITHOUT MYELOPATHY OR RADICULOPATHY: ICD-10-CM

## 2024-10-16 NOTE — TELEPHONE ENCOUNTER
Refill request     Med name-methylphenidate CD  Med dose-30mg  Med directions-take 1 capsule daily in the morning    Med name-hydrocodone-acetaminophen  Med dose-5-300mg  Med directions-take 1/2 to 1 tablet daily as needed for pain    Pharmacy-[facility]  Pharmacy address-[facility]    LR-09/19/24(both scripts)  LV-08/19/24  Nv-10/31/24    Thank-you

## 2024-10-17 RX ORDER — METHYLPHENIDATE HYDROCHLORIDE 30 MG/1
30 CAPSULE, EXTENDED RELEASE ORAL EVERY MORNING
Qty: 30 CAPSULE | Refills: 0 | Status: SHIPPED | OUTPATIENT
Start: 2024-10-17

## 2024-10-17 RX ORDER — HYDROCODONE BITARTRATE AND ACETAMINOPHEN 5; 300 MG/1; MG/1
1 TABLET ORAL DAILY
Qty: 30 TABLET | Refills: 0 | Status: SHIPPED | OUTPATIENT
Start: 2024-10-17

## 2024-10-28 ENCOUNTER — LAB (OUTPATIENT)
Dept: LAB | Facility: LAB | Age: 49
End: 2024-10-28
Payer: COMMERCIAL

## 2024-10-28 DIAGNOSIS — E55.9 VITAMIN D DEFICIENCY: ICD-10-CM

## 2024-10-28 DIAGNOSIS — Z79.890 HORMONE REPLACEMENT THERAPY: ICD-10-CM

## 2024-10-28 DIAGNOSIS — E78.5 HYPERLIPIDEMIA, UNSPECIFIED HYPERLIPIDEMIA TYPE: ICD-10-CM

## 2024-10-28 DIAGNOSIS — R30.0 DYSURIA: ICD-10-CM

## 2024-10-28 DIAGNOSIS — E34.9 HORMONE IMBALANCE: ICD-10-CM

## 2024-10-28 LAB
25(OH)D3 SERPL-MCNC: 32 NG/ML (ref 30–100)
ALBUMIN SERPL BCP-MCNC: 4.4 G/DL (ref 3.4–5)
ALP SERPL-CCNC: 55 U/L (ref 33–110)
ALT SERPL W P-5'-P-CCNC: 6 U/L (ref 7–45)
ANION GAP SERPL CALC-SCNC: 13 MMOL/L (ref 10–20)
APPEARANCE UR: ABNORMAL
AST SERPL W P-5'-P-CCNC: 12 U/L (ref 9–39)
BACTERIA #/AREA URNS AUTO: ABNORMAL /HPF
BILIRUB SERPL-MCNC: 0.4 MG/DL (ref 0–1.2)
BILIRUB UR STRIP.AUTO-MCNC: NEGATIVE MG/DL
BUN SERPL-MCNC: 13 MG/DL (ref 6–23)
CALCIUM SERPL-MCNC: 8.9 MG/DL (ref 8.6–10.3)
CHLORIDE SERPL-SCNC: 104 MMOL/L (ref 98–107)
CHOLEST SERPL-MCNC: 160 MG/DL (ref 0–199)
CHOLESTEROL/HDL RATIO: 2.9
CO2 SERPL-SCNC: 27 MMOL/L (ref 21–32)
COLOR UR: YELLOW
CREAT SERPL-MCNC: 0.62 MG/DL (ref 0.5–1.05)
EGFRCR SERPLBLD CKD-EPI 2021: >90 ML/MIN/1.73M*2
ERYTHROCYTE [DISTWIDTH] IN BLOOD BY AUTOMATED COUNT: 14.1 % (ref 11.5–14.5)
ESTRADIOL SERPL-MCNC: <19 PG/ML
GLUCOSE SERPL-MCNC: 79 MG/DL (ref 74–99)
GLUCOSE UR STRIP.AUTO-MCNC: NORMAL MG/DL
HCT VFR BLD AUTO: 40.2 % (ref 36–46)
HDLC SERPL-MCNC: 54.8 MG/DL
HGB BLD-MCNC: 12.7 G/DL (ref 12–16)
KETONES UR STRIP.AUTO-MCNC: NEGATIVE MG/DL
LDLC SERPL CALC-MCNC: 86 MG/DL
LEUKOCYTE ESTERASE UR QL STRIP.AUTO: NEGATIVE
MCH RBC QN AUTO: 27.4 PG (ref 26–34)
MCHC RBC AUTO-ENTMCNC: 31.6 G/DL (ref 32–36)
MCV RBC AUTO: 87 FL (ref 80–100)
MUCOUS THREADS #/AREA URNS AUTO: ABNORMAL /LPF
NITRITE UR QL STRIP.AUTO: NEGATIVE
NON HDL CHOLESTEROL: 105 MG/DL (ref 0–149)
NRBC BLD-RTO: 0 /100 WBCS (ref 0–0)
PH UR STRIP.AUTO: 6.5 [PH]
PLATELET # BLD AUTO: 303 X10*3/UL (ref 150–450)
POTASSIUM SERPL-SCNC: 3.9 MMOL/L (ref 3.5–5.3)
PROGEST SERPL-MCNC: 0.4 NG/ML
PROT SERPL-MCNC: 6.9 G/DL (ref 6.4–8.2)
PROT UR STRIP.AUTO-MCNC: ABNORMAL MG/DL
RBC # BLD AUTO: 4.63 X10*6/UL (ref 4–5.2)
RBC # UR STRIP.AUTO: NEGATIVE /UL
RBC #/AREA URNS AUTO: ABNORMAL /HPF
SODIUM SERPL-SCNC: 140 MMOL/L (ref 136–145)
SP GR UR STRIP.AUTO: 1.02
SQUAMOUS #/AREA URNS AUTO: ABNORMAL /HPF
TESTOST SERPL-MCNC: <30 NG/DL (ref 0–70)
TRIGL SERPL-MCNC: 98 MG/DL (ref 0–149)
TSH SERPL-ACNC: 1.42 MIU/L (ref 0.44–3.98)
UROBILINOGEN UR STRIP.AUTO-MCNC: NORMAL MG/DL
VLDL: 20 MG/DL (ref 0–40)
WBC # BLD AUTO: 6.6 X10*3/UL (ref 4.4–11.3)
WBC #/AREA URNS AUTO: ABNORMAL /HPF

## 2024-10-28 PROCEDURE — 84403 ASSAY OF TOTAL TESTOSTERONE: CPT

## 2024-10-28 PROCEDURE — 80061 LIPID PANEL: CPT

## 2024-10-28 PROCEDURE — 82626 DEHYDROEPIANDROSTERONE: CPT

## 2024-10-28 PROCEDURE — 80053 COMPREHEN METABOLIC PANEL: CPT

## 2024-10-28 PROCEDURE — 81001 URINALYSIS AUTO W/SCOPE: CPT

## 2024-10-28 PROCEDURE — 84443 ASSAY THYROID STIM HORMONE: CPT

## 2024-10-28 PROCEDURE — 36415 COLL VENOUS BLD VENIPUNCTURE: CPT

## 2024-10-28 PROCEDURE — 82679 ASSAY OF ESTRONE: CPT

## 2024-10-28 PROCEDURE — 82677 ASSAY OF ESTRIOL: CPT

## 2024-10-28 PROCEDURE — 85027 COMPLETE CBC AUTOMATED: CPT

## 2024-10-28 PROCEDURE — 82306 VITAMIN D 25 HYDROXY: CPT

## 2024-10-28 PROCEDURE — 84144 ASSAY OF PROGESTERONE: CPT

## 2024-10-28 PROCEDURE — 82670 ASSAY OF TOTAL ESTRADIOL: CPT

## 2024-10-31 ENCOUNTER — APPOINTMENT (OUTPATIENT)
Dept: PRIMARY CARE | Facility: CLINIC | Age: 49
End: 2024-10-31
Payer: COMMERCIAL

## 2024-10-31 VITALS
WEIGHT: 132 LBS | BODY MASS INDEX: 24.29 KG/M2 | HEART RATE: 85 BPM | OXYGEN SATURATION: 99 % | DIASTOLIC BLOOD PRESSURE: 84 MMHG | SYSTOLIC BLOOD PRESSURE: 125 MMHG | HEIGHT: 62 IN

## 2024-10-31 DIAGNOSIS — K58.0 IRRITABLE BOWEL SYNDROME WITH DIARRHEA: ICD-10-CM

## 2024-10-31 DIAGNOSIS — R30.0 DYSURIA: ICD-10-CM

## 2024-10-31 DIAGNOSIS — E55.9 VITAMIN D DEFICIENCY: ICD-10-CM

## 2024-10-31 DIAGNOSIS — R06.02 SHORTNESS OF BREATH: ICD-10-CM

## 2024-10-31 DIAGNOSIS — E34.9 HORMONE IMBALANCE: ICD-10-CM

## 2024-10-31 DIAGNOSIS — Z00.00 ENCOUNTER FOR HEALTH MAINTENANCE EXAMINATION: Primary | ICD-10-CM

## 2024-10-31 DIAGNOSIS — E78.5 HYPERLIPIDEMIA, UNSPECIFIED HYPERLIPIDEMIA TYPE: ICD-10-CM

## 2024-10-31 DIAGNOSIS — Z79.890 HORMONE REPLACEMENT THERAPY: ICD-10-CM

## 2024-10-31 DIAGNOSIS — F98.8 ATTENTION DEFICIT DISORDER (ADD) WITHOUT HYPERACTIVITY: ICD-10-CM

## 2024-10-31 DIAGNOSIS — K52.831 COLLAGENOUS COLITIS: ICD-10-CM

## 2024-10-31 DIAGNOSIS — M47.816 SPONDYLOSIS OF LUMBAR REGION WITHOUT MYELOPATHY OR RADICULOPATHY: ICD-10-CM

## 2024-10-31 PROCEDURE — 99396 PREV VISIT EST AGE 40-64: CPT | Performed by: FAMILY MEDICINE

## 2024-10-31 PROCEDURE — 99214 OFFICE O/P EST MOD 30 MIN: CPT | Performed by: FAMILY MEDICINE

## 2024-10-31 PROCEDURE — 93000 ELECTROCARDIOGRAM COMPLETE: CPT | Performed by: FAMILY MEDICINE

## 2024-10-31 PROCEDURE — 3008F BODY MASS INDEX DOCD: CPT | Performed by: FAMILY MEDICINE

## 2024-10-31 RX ORDER — METHYLPHENIDATE HYDROCHLORIDE 30 MG/1
30 CAPSULE, EXTENDED RELEASE ORAL EVERY MORNING
Qty: 30 CAPSULE | Refills: 0 | Status: SHIPPED | OUTPATIENT
Start: 2024-11-18

## 2024-10-31 RX ORDER — CELECOXIB 100 MG/1
100 CAPSULE ORAL DAILY
Qty: 90 CAPSULE | Refills: 2 | Status: SHIPPED | OUTPATIENT
Start: 2024-10-31

## 2024-10-31 RX ORDER — HYDROCODONE BITARTRATE AND ACETAMINOPHEN 5; 300 MG/1; MG/1
1 TABLET ORAL DAILY
Qty: 30 TABLET | Refills: 0 | Status: SHIPPED | OUTPATIENT
Start: 2024-11-18

## 2024-10-31 RX ORDER — METHYLPHENIDATE HYDROCHLORIDE 30 MG/1
30 CAPSULE, EXTENDED RELEASE ORAL EVERY MORNING
Qty: 30 CAPSULE | Refills: 0 | Status: SHIPPED | OUTPATIENT
Start: 2024-12-16

## 2024-10-31 RX ORDER — HYDROCODONE BITARTRATE AND ACETAMINOPHEN 5; 300 MG/1; MG/1
1 TABLET ORAL DAILY
Qty: 30 TABLET | Refills: 0 | Status: SHIPPED | OUTPATIENT
Start: 2024-12-16

## 2024-10-31 RX ORDER — HYDROCODONE BITARTRATE AND ACETAMINOPHEN 5; 300 MG/1; MG/1
1 TABLET ORAL DAILY
Qty: 30 TABLET | Refills: 0 | Status: SHIPPED | OUTPATIENT
Start: 2025-01-13

## 2024-10-31 RX ORDER — METHYLPHENIDATE HYDROCHLORIDE 30 MG/1
30 CAPSULE, EXTENDED RELEASE ORAL EVERY MORNING
Qty: 30 CAPSULE | Refills: 0 | Status: SHIPPED | OUTPATIENT
Start: 2025-01-13

## 2024-10-31 RX ORDER — ERGOCALCIFEROL 1.25 MG/1
50000 CAPSULE ORAL
Qty: 12 CAPSULE | Refills: 3 | Status: SHIPPED | OUTPATIENT
Start: 2024-11-03 | End: 2025-10-05

## 2024-11-01 LAB
DHEA SERPL-MCNC: 4.44 NG/ML (ref 0.63–4.7)
ESTRIOL SERPL-MCNC: <0.1 NG/ML

## 2024-11-03 LAB — ESTRONE SERPL-MCNC: 15.9 PG/ML

## 2024-12-11 ENCOUNTER — APPOINTMENT (OUTPATIENT)
Dept: PRIMARY CARE | Facility: CLINIC | Age: 49
End: 2024-12-11
Payer: COMMERCIAL

## 2024-12-17 DIAGNOSIS — F98.8 ATTENTION DEFICIT DISORDER (ADD) WITHOUT HYPERACTIVITY: Primary | ICD-10-CM

## 2024-12-17 RX ORDER — DEXTROAMPHETAMINE SACCHARATE, AMPHETAMINE ASPARTATE MONOHYDRATE, DEXTROAMPHETAMINE SULFATE AND AMPHETAMINE SULFATE 5; 5; 5; 5 MG/1; MG/1; MG/1; MG/1
20 CAPSULE, EXTENDED RELEASE ORAL EVERY MORNING
Qty: 90 CAPSULE | Refills: 0 | Status: SHIPPED | OUTPATIENT
Start: 2024-12-17 | End: 2025-03-17

## 2024-12-26 ENCOUNTER — APPOINTMENT (OUTPATIENT)
Dept: PRIMARY CARE | Facility: CLINIC | Age: 49
End: 2024-12-26
Payer: COMMERCIAL

## 2024-12-30 DIAGNOSIS — F98.8 ATTENTION DEFICIT DISORDER (ADD) WITHOUT HYPERACTIVITY: ICD-10-CM

## 2024-12-30 RX ORDER — DEXTROAMPHETAMINE SACCHARATE, AMPHETAMINE ASPARTATE MONOHYDRATE, DEXTROAMPHETAMINE SULFATE AND AMPHETAMINE SULFATE 5; 5; 5; 5 MG/1; MG/1; MG/1; MG/1
20 CAPSULE, EXTENDED RELEASE ORAL EVERY MORNING
Qty: 30 CAPSULE | Refills: 0 | Status: SHIPPED | OUTPATIENT
Start: 2025-02-12 | End: 2025-03-14

## 2024-12-30 RX ORDER — DEXTROAMPHETAMINE SACCHARATE, AMPHETAMINE ASPARTATE MONOHYDRATE, DEXTROAMPHETAMINE SULFATE AND AMPHETAMINE SULFATE 5; 5; 5; 5 MG/1; MG/1; MG/1; MG/1
20 CAPSULE, EXTENDED RELEASE ORAL EVERY MORNING
Qty: 30 CAPSULE | Refills: 0 | Status: SHIPPED | OUTPATIENT
Start: 2025-01-15 | End: 2025-02-14

## 2024-12-31 ENCOUNTER — APPOINTMENT (OUTPATIENT)
Dept: PRIMARY CARE | Facility: CLINIC | Age: 49
End: 2024-12-31
Payer: COMMERCIAL

## 2025-01-13 ENCOUNTER — LAB (OUTPATIENT)
Dept: LAB | Facility: LAB | Age: 50
End: 2025-01-13
Payer: COMMERCIAL

## 2025-01-13 DIAGNOSIS — K52.831 COLLAGENOUS COLITIS: ICD-10-CM

## 2025-01-13 DIAGNOSIS — K58.0 IRRITABLE BOWEL SYNDROME WITH DIARRHEA: ICD-10-CM

## 2025-01-13 LAB
GLIADIN PEPTIDE IGA SER IA-ACNC: 1.1 U/ML
TTG IGA SER IA-ACNC: <1 U/ML

## 2025-01-13 PROCEDURE — 83516 IMMUNOASSAY NONANTIBODY: CPT

## 2025-01-15 LAB
GLIADIN PEPTIDE IGG SER IA-ACNC: 0.7 FLU (ref 0–4.99)
TTG IGG SER IA-ACNC: <0.82 FLU (ref 0–4.99)

## 2025-01-16 ENCOUNTER — LAB (OUTPATIENT)
Dept: LAB | Facility: LAB | Age: 50
End: 2025-01-16
Payer: COMMERCIAL

## 2025-01-16 DIAGNOSIS — K58.0 IRRITABLE BOWEL SYNDROME WITH DIARRHEA: ICD-10-CM

## 2025-01-16 DIAGNOSIS — K52.831 COLLAGENOUS COLITIS: ICD-10-CM

## 2025-01-16 PROCEDURE — 87329 GIARDIA AG IA: CPT

## 2025-01-16 PROCEDURE — 87328 CRYPTOSPORIDIUM AG IA: CPT

## 2025-01-16 PROCEDURE — 82653 EL-1 FECAL QUANTITATIVE: CPT

## 2025-01-19 LAB
CRYPTOSP AG STL QL IA: NEGATIVE
G LAMBLIA AG STL QL IA: NEGATIVE

## 2025-01-20 ENCOUNTER — APPOINTMENT (OUTPATIENT)
Dept: PRIMARY CARE | Facility: CLINIC | Age: 50
End: 2025-01-20
Payer: COMMERCIAL

## 2025-01-20 VITALS
HEIGHT: 62 IN | DIASTOLIC BLOOD PRESSURE: 70 MMHG | HEART RATE: 102 BPM | OXYGEN SATURATION: 99 % | WEIGHT: 129 LBS | BODY MASS INDEX: 23.74 KG/M2 | SYSTOLIC BLOOD PRESSURE: 136 MMHG

## 2025-01-20 DIAGNOSIS — M47.816 SPONDYLOSIS OF LUMBAR REGION WITHOUT MYELOPATHY OR RADICULOPATHY: ICD-10-CM

## 2025-01-20 DIAGNOSIS — K58.0 IRRITABLE BOWEL SYNDROME WITH DIARRHEA: ICD-10-CM

## 2025-01-20 DIAGNOSIS — K52.831 COLLAGENOUS COLITIS: Primary | ICD-10-CM

## 2025-01-20 DIAGNOSIS — E73.9 LACTOSE INTOLERANCE: ICD-10-CM

## 2025-01-20 DIAGNOSIS — F98.8 ATTENTION DEFICIT DISORDER (ADD) WITHOUT HYPERACTIVITY: ICD-10-CM

## 2025-01-20 PROCEDURE — 99214 OFFICE O/P EST MOD 30 MIN: CPT | Performed by: FAMILY MEDICINE

## 2025-01-20 PROCEDURE — 3008F BODY MASS INDEX DOCD: CPT | Performed by: FAMILY MEDICINE

## 2025-01-20 RX ORDER — HYDROCODONE BITARTRATE AND ACETAMINOPHEN 5; 300 MG/1; MG/1
1 TABLET ORAL DAILY
Qty: 30 TABLET | Refills: 0 | Status: SHIPPED | OUTPATIENT
Start: 2025-03-13

## 2025-01-20 RX ORDER — DEXTROAMPHETAMINE SACCHARATE, AMPHETAMINE ASPARTATE MONOHYDRATE, DEXTROAMPHETAMINE SULFATE AND AMPHETAMINE SULFATE 5; 5; 5; 5 MG/1; MG/1; MG/1; MG/1
20 CAPSULE, EXTENDED RELEASE ORAL EVERY MORNING
Qty: 30 CAPSULE | Refills: 0 | Status: SHIPPED | OUTPATIENT
Start: 2025-04-09 | End: 2025-05-09

## 2025-01-20 RX ORDER — HYDROCODONE BITARTRATE AND ACETAMINOPHEN 5; 300 MG/1; MG/1
1 TABLET ORAL DAILY
Qty: 30 TABLET | Refills: 0 | Status: SHIPPED | OUTPATIENT
Start: 2025-04-10

## 2025-01-20 RX ORDER — HYDROCODONE BITARTRATE AND ACETAMINOPHEN 5; 300 MG/1; MG/1
1 TABLET ORAL DAILY
Qty: 30 TABLET | Refills: 0 | Status: SHIPPED | OUTPATIENT
Start: 2025-02-13

## 2025-01-20 RX ORDER — DEXTROAMPHETAMINE SACCHARATE, AMPHETAMINE ASPARTATE MONOHYDRATE, DEXTROAMPHETAMINE SULFATE AND AMPHETAMINE SULFATE 5; 5; 5; 5 MG/1; MG/1; MG/1; MG/1
20 CAPSULE, EXTENDED RELEASE ORAL EVERY MORNING
Qty: 30 CAPSULE | Refills: 0 | Status: SHIPPED | OUTPATIENT
Start: 2025-03-12 | End: 2025-04-11

## 2025-01-20 NOTE — PROGRESS NOTES
"Subjective     Patient ID: [id number]     Loan Colorado is a 50 y.o. female who presents for check hormones\.    HPI  Stool tests that have been completed indicate no infectious etiology of loose stools.  No evidence of gluten intolerance.  Pancreatic elastase is pending.  Stools remain loose or soft.  No bleeding.  eating more fiber but not sure how many grams per day    No menses since Aug 2024.  Never started Ortho-Cyclen.  No vasomotor issues.  Hormone labs indicate menopause    Fatigue remains a problem.  Always cold.    Sleep interrupted.  Melatonin not as effective as had been.  Very stressful at work.      ADD - Adderall XR 20 mg is good.  She is able to complete tasks and remain focused at work without experiencing adverse effects.  This is a recent decrease in dose.  Last dose today.  Last filled 1/16/24    Chronic low back pain.  Patient continues to use Vicodin 5-300 once daily at bedtime with good pain relief.  She had been sleeping better until work stress increased.  Last refill January 16, 2025.  Last dose was last night.      Objective   /70   Pulse 102   Ht 1.581 m (5' 2.25\")   Wt 58.5 kg (129 lb)   SpO2 99%   BMI 23.41 kg/m²    Physical Exam:   Alert and oriented ×3.  No acute distress.  No tremors noted.  Gait is normal.  Mood and affect are normal.    All recent lab reviewed again with patient.  Pancreatic elastase is pending    Assessment/Plan   1. Collagenous colitis (Primary)    2. Irritable bowel syndrome with diarrhea  Increase fiber to 30 g daily.  Gave example cereals: Fiber 1 and All-Bran.    3. Lactose intolerance    4. Attention deficit disorder (ADD) without hyperactivity  - amphetamine-dextroamphetamine XR (Adderall XR) 20 mg 24 hr capsule; Take 1 capsule (20 mg) by mouth once daily in the morning. Do not crush or chew. Do not fill before March 12, 2025.  Dispense: 30 capsule; Refill: 0  - amphetamine-dextroamphetamine XR (Adderall XR) 20 mg 24 hr capsule; Take 1 " capsule (20 mg) by mouth once daily in the morning. Do not crush or chew. Do not fill before April 9, 2025.  Dispense: 30 capsule; Refill: 0    5. Spondylosis of lumbar region without myelopathy or radiculopathy  - HYDROcodone-acetaminophen (Vicodin) 5-300 mg tablet; Take 1 tablet by mouth once daily. TAKE 1/2 TO 1 TABLET BY MOUTH DAILY AS NEEDED FOR PAIN Do not fill before February 13, 2025.  Dispense: 30 tablet; Refill: 0  - HYDROcodone-acetaminophen (Vicodin) 5-300 mg tablet; Take 1 tablet by mouth once daily. TAKE 1/2 TO 1 TABLET BY MOUTH DAILY AS NEEDED FOR PAIN Do not fill before March 13, 2025.  Dispense: 30 tablet; Refill: 0  - HYDROcodone-acetaminophen (Vicodin) 5-300 mg tablet; Take 1 tablet by mouth once daily. TAKE 1/2 TO 1 TABLET BY MOUTH DAILY AS NEEDED FOR PAIN Do not fill before April 10, 2025.  Dispense: 30 tablet; Refill: 0        Follow up in April as scheduled for medical management    I will continue to monitor, evaluate, assess and treat all problems/diagnoses as appropriate and continue to collaborate with specialists.    Contact office or send a  MY Chart message with any questions or concerns    Encouraged to sign up with my  My Chart  Patient will only be notified of labs that require medical intervention.    Prescriptions will not be filled unless you are compliant with your follow up appointments or have a follow up appointment scheduled as per instruction of your physician. Refills should be requested at the time of your visit.    **Charting was completed using voice recognition technology and may include unintended errors**    Gary Diaz DO, [facility]  [address]  [place] [address] [phone]  Problem List Items Addressed This Visit    None      Gary Diaz DO

## 2025-01-22 LAB — O+P STL MICRO: NEGATIVE

## 2025-01-23 LAB — ELASTASE PANC STL-MCNT: 649 UG/G

## 2025-01-27 ENCOUNTER — APPOINTMENT (OUTPATIENT)
Dept: PRIMARY CARE | Facility: CLINIC | Age: 50
End: 2025-01-27
Payer: COMMERCIAL

## 2025-02-01 DIAGNOSIS — J40 BRONCHITIS: Primary | ICD-10-CM

## 2025-02-01 RX ORDER — AZITHROMYCIN 250 MG/1
TABLET, FILM COATED ORAL
Qty: 6 TABLET | Refills: 0 | Status: SHIPPED | OUTPATIENT
Start: 2025-02-01 | End: 2025-02-06

## 2025-04-21 ENCOUNTER — APPOINTMENT (OUTPATIENT)
Dept: PRIMARY CARE | Facility: CLINIC | Age: 50
End: 2025-04-21
Payer: COMMERCIAL

## 2025-04-21 VITALS
BODY MASS INDEX: 24.29 KG/M2 | HEART RATE: 92 BPM | SYSTOLIC BLOOD PRESSURE: 129 MMHG | WEIGHT: 132 LBS | DIASTOLIC BLOOD PRESSURE: 82 MMHG | HEIGHT: 62 IN | OXYGEN SATURATION: 99 %

## 2025-04-21 DIAGNOSIS — Z51.81 ENCOUNTER FOR THERAPEUTIC DRUG LEVEL MONITORING: ICD-10-CM

## 2025-04-21 DIAGNOSIS — F98.8 ATTENTION DEFICIT DISORDER (ADD) WITHOUT HYPERACTIVITY: Primary | ICD-10-CM

## 2025-04-21 DIAGNOSIS — Z02.83 ENCOUNTER FOR DRUG SCREENING: ICD-10-CM

## 2025-04-21 DIAGNOSIS — R53.83 FATIGUE, UNSPECIFIED TYPE: ICD-10-CM

## 2025-04-21 DIAGNOSIS — Z79.890 HORMONE REPLACEMENT THERAPY (HRT): ICD-10-CM

## 2025-04-21 DIAGNOSIS — M47.816 SPONDYLOSIS OF LUMBAR REGION WITHOUT MYELOPATHY OR RADICULOPATHY: ICD-10-CM

## 2025-04-21 PROCEDURE — 99214 OFFICE O/P EST MOD 30 MIN: CPT | Performed by: FAMILY MEDICINE

## 2025-04-21 PROCEDURE — 3008F BODY MASS INDEX DOCD: CPT | Performed by: FAMILY MEDICINE

## 2025-04-21 RX ORDER — CELECOXIB 100 MG/1
100 CAPSULE ORAL DAILY
Qty: 90 CAPSULE | Refills: 2 | Status: SHIPPED | OUTPATIENT
Start: 2025-04-21

## 2025-04-21 RX ORDER — DEXTROAMPHETAMINE SACCHARATE, AMPHETAMINE ASPARTATE MONOHYDRATE, DEXTROAMPHETAMINE SULFATE AND AMPHETAMINE SULFATE 5; 5; 5; 5 MG/1; MG/1; MG/1; MG/1
20 CAPSULE, EXTENDED RELEASE ORAL EVERY MORNING
Qty: 30 CAPSULE | Refills: 0 | Status: SHIPPED | OUTPATIENT
Start: 2025-06-08 | End: 2025-07-08

## 2025-04-21 RX ORDER — HYDROCODONE BITARTRATE AND ACETAMINOPHEN 5; 300 MG/1; MG/1
1 TABLET ORAL DAILY
Qty: 30 TABLET | Refills: 0 | Status: SHIPPED | OUTPATIENT
Start: 2025-07-06

## 2025-04-21 RX ORDER — HYDROCODONE BITARTRATE AND ACETAMINOPHEN 5; 300 MG/1; MG/1
1 TABLET ORAL DAILY
Qty: 30 TABLET | Refills: 0 | Status: SHIPPED | OUTPATIENT
Start: 2025-06-08

## 2025-04-21 RX ORDER — DEXTROAMPHETAMINE SACCHARATE, AMPHETAMINE ASPARTATE MONOHYDRATE, DEXTROAMPHETAMINE SULFATE AND AMPHETAMINE SULFATE 5; 5; 5; 5 MG/1; MG/1; MG/1; MG/1
20 CAPSULE, EXTENDED RELEASE ORAL EVERY MORNING
Qty: 30 CAPSULE | Refills: 0 | Status: SHIPPED | OUTPATIENT
Start: 2025-07-06 | End: 2025-08-05

## 2025-04-21 RX ORDER — HYDROCODONE BITARTRATE AND ACETAMINOPHEN 5; 300 MG/1; MG/1
1 TABLET ORAL DAILY
Qty: 30 TABLET | Refills: 0 | Status: SHIPPED | OUTPATIENT
Start: 2025-05-11

## 2025-04-21 RX ORDER — DEXTROAMPHETAMINE SACCHARATE, AMPHETAMINE ASPARTATE MONOHYDRATE, DEXTROAMPHETAMINE SULFATE AND AMPHETAMINE SULFATE 5; 5; 5; 5 MG/1; MG/1; MG/1; MG/1
20 CAPSULE, EXTENDED RELEASE ORAL EVERY MORNING
Qty: 30 CAPSULE | Refills: 0 | Status: SHIPPED | OUTPATIENT
Start: 2025-05-11 | End: 2025-06-10

## 2025-04-21 NOTE — PROGRESS NOTES
"General Medical Management Note    50 y.o. female presents for Medical Management  HPI  Attention deficit disorder is managed with Adderall XR 20 mg once daily.  Last refill April 16, 2025.    Chronic low back pain due to DJD of the lumbar spine is managed with Norco 1/day.  Patient typically will take 1/2 pill twice daily.  Last refill April 13, 2025.  Also using Celebrex 100 mg daily and Flexeril if needed for muscle spasm.    No longer using oral contraceptive agent.  Has not had a menstrual period since stopping OCA in 9/2024.  Denies hot flushes.  Patient would like hormone levels tested.        Medical History[1]   Surgical History[2]  Family History[3]   Social History     Socioeconomic History    Marital status:      Spouse name: Not on file    Number of children: Not on file    Years of education: Not on file    Highest education level: Not on file   Occupational History    Not on file   Tobacco Use    Smoking status: Some Days     Types: Cigarettes    Smokeless tobacco: Never   Substance and Sexual Activity    Alcohol use: Never    Drug use: Yes     Types: Hydrocodone, Methylphenidate    Sexual activity: Not on file   Other Topics Concern    Not on file   Social History Narrative    Not on file     Social Drivers of Health     Financial Resource Strain: Not on file   Food Insecurity: Not on file   Transportation Needs: Not on file   Physical Activity: Not on file   Stress: Not on file   Social Connections: Not on file   Intimate Partner Violence: Not on file   Housing Stability: Not on file       Medications Ordered Prior to Encounter[4]    Allergies[5]      ROS: Denies chest pain, SOB, Headache, GI problems     Visit Vitals  /82   Pulse 92   Ht 1.581 m (5' 2.25\")   Wt 59.9 kg (132 lb)   SpO2 99%   BMI 23.95 kg/m²   Smoking Status Some Days   BSA 1.62 m²      Vitals:    04/21/25 0851   BP: 129/82   Pulse: 92   SpO2: 99%   Weight: 59.9 kg (132 lb)   Height: 1.581 m (5' 2.25\")       PHYSICAL " EXAM:  Alert and oriented x3.  Eyes: EOM grossly intact  Neck supple without lymph adenopathy or carotid bruit.  No masses or thyromegaly  Heart regular rate and rhythm without murmur.  Lungs clear to auscultation.  Legs without edema.  Gait is non-antalgic  Speech clear.  Hearing adequate.          DIAGNOSIS/PLAN:    1. Encounter for therapeutic drug level monitoring  - Opiate/Opioid/Benzo Prescription Compliance; Future  - Amphetamine Confirm, Urine; Future  - Drug Screen, Urine With Reflex to Confirmation  - Amphetamine Confirm, Urine    2. Encounter for drug screening  - Opiate/Opioid/Benzo Prescription Compliance; Future  - Amphetamine Confirm, Urine; Future  - Drug Screen, Urine With Reflex to Confirmation  - Amphetamine Confirm, Urine    3. Fatigue, unspecified type  - Comprehensive Metabolic Panel; Future    4. Attention deficit disorder (ADD) without hyperactivity  - amphetamine-dextroamphetamine XR (Adderall XR) 20 mg 24 hr capsule; Take 1 capsule (20 mg) by mouth once daily in the morning. Do not crush or chew. Do not fill before May 11, 2025.  Dispense: 30 capsule; Refill: 0  - amphetamine-dextroamphetamine XR (Adderall XR) 20 mg 24 hr capsule; Take 1 capsule (20 mg) by mouth once daily in the morning. Do not crush or chew. Do not fill before June 8, 2025.  Dispense: 30 capsule; Refill: 0  - amphetamine-dextroamphetamine XR (Adderall XR) 20 mg 24 hr capsule; Take 1 capsule (20 mg) by mouth once daily in the morning. Do not crush or chew. Do not fill before July 6, 2025.  Dispense: 30 capsule; Refill: 0  - Comprehensive Metabolic Panel    5. Spondylosis of lumbar region without myelopathy or radiculopathy  - HYDROcodone-acetaminophen (Vicodin) 5-300 mg tablet; Take 1 tablet by mouth once daily. TAKE 1/2 TO 1 TABLET BY MOUTH DAILY AS NEEDED FOR PAIN Do not fill before May 11, 2025.  Dispense: 30 tablet; Refill: 0  - HYDROcodone-acetaminophen (Vicodin) 5-300 mg tablet; Take 1 tablet by mouth once daily.  TAKE 1/2 TO 1 TABLET BY MOUTH DAILY AS NEEDED FOR PAIN Do not fill before June 8, 2025.  Dispense: 30 tablet; Refill: 0  - HYDROcodone-acetaminophen (Vicodin) 5-300 mg tablet; Take 1 tablet by mouth once daily. TAKE 1/2 TO 1 TABLET BY MOUTH DAILY AS NEEDED FOR PAIN Do not fill before July 6, 2025.  Dispense: 30 tablet; Refill: 0  - celecoxib (CeleBREX) 100 mg capsule; Take 1 capsule (100 mg) by mouth once daily.  Dispense: 90 capsule; Refill: 2  - Comprehensive Metabolic Panel    6. Hormone replacement therapy (HRT) (Primary)  - Cortisol AM  - DHEA-Sulfate  - Estradiol  - Estrone  - Progesterone  - Sex Hormone Binding Globulin  - Testosterone,Free and Total  - Vitamin D 25-Hydroxy,Total (for eval of Vitamin D levels)      Follow up in 3 months for controlled substance therapy refill; 6 months for annual comprehensive medical evaluation    I will continue to monitor, evaluate, assess and treat all problems/diagnoses as appropriate and continue to collaborate with specialists.    Contact office or send a  MY Chart message with any questions or concerns    Encouraged to sign up with my  My Chart  Patient will only be notified of labs that require medical intervention.    Prescriptions will not be filled unless you are compliant with your follow up appointments or have a follow up appointment scheduled as per instruction of your physician. Refills should be requested at the time of your visit.    **Charting was completed using voice recognition technology and may include unintended errors**    Gary Diaz DO, [facility]  [address]  [place] [id number] [phone]  Gary Diaz DO, FACOFP           [1]   Past Medical History:  Diagnosis Date    Complete rotator cuff tear or rupture of right shoulder, not specified as traumatic 12/15/2017    Complete tear of right rotator cuff    Disease due to severe acute respiratory syndrome coronavirus 2 (SARS-CoV-2) 01/18/2024    Comment on above: 2019 December.  Positive antibodies 2021;   [2]   Past Surgical History:  Procedure Laterality Date     SECTION, CLASSIC  2014     Section    COLONOSCOPY  2018    Complete Colonoscopy   [3] No family history on file.  [4]   Current Outpatient Medications on File Prior to Visit   Medication Sig Dispense Refill    amphetamine-dextroamphetamine XR (Adderall XR) 20 mg 24 hr capsule Take 1 capsule (20 mg) by mouth once daily in the morning. Do not crush or chew. Do not fill before 2025. 30 capsule 0    ascorbic acid (Vitamin C) 500 mg tablet Take 1 tablet (500 mg) by mouth once daily.      b complex 0.4 mg tablet Take 1 tablet by mouth once daily.      calcium carbonate-vit D3-min 600 mg calcium- 400 unit tablet Take 1 tablet by mouth in the morning and 1 tablet before bedtime.      celecoxib (CeleBREX) 100 mg capsule Take 1 capsule (100 mg) by mouth once daily. 90 capsule 2    cholecalciferol (Vitamin D3) 25 MCG (1000 UT) capsule Take 1 capsule (25 mcg) by mouth once daily.      cyclobenzaprine (Flexeril) 10 mg tablet Take 1 tablet (10 mg) by mouth 3 times a day as needed for muscle spasms. 60 tablet 2    ergocalciferol (Vitamin D-2) 1.25 MG (15081 UT) capsule Take 1 capsule (50,000 Units) by mouth 1 (one) time per week. 12 capsule 3    fluticasone (Flonase) 50 mcg/actuation nasal spray Administer 2 sprays into each nostril once daily. 16 g 3    HYDROcodone-acetaminophen (Vicodin) 5-300 mg tablet Take 1 tablet by mouth once daily. TAKE 1/2 TO 1 TABLET BY MOUTH DAILY AS NEEDED FOR PAIN Do not fill before 2025. 30 tablet 0    HYDROcodone-acetaminophen (Vicodin) 5-300 mg tablet Take 1 tablet by mouth once daily. TAKE 1/2 TO 1 TABLET BY MOUTH DAILY AS NEEDED FOR PAIN Do not fill before 2025. 30 tablet 0    HYDROcodone-acetaminophen (Vicodin) 5-300 mg tablet Take 1 tablet by mouth once daily. TAKE 1/2 TO 1 TABLET BY MOUTH DAILY AS NEEDED FOR PAIN Do not fill before April 10,  2025. 30 tablet 0    iron bis-glycinat/vit C/FA/B12 (GENTLE IRON ORAL) Take by mouth.      naloxone (Narcan) 4 mg/0.1 mL nasal spray Administer into affected nostril(s). 1 actuation in one nostril x 1, may reat dose q2-3 mins until responsive or EMS arrives      zinc gluconate 100 mg tablet Take by mouth.      amphetamine-dextroamphetamine XR (Adderall XR) 20 mg 24 hr capsule Take 1 capsule (20 mg) by mouth once daily in the morning. Do not crush or chew. Do not fill before February 12, 2025. 30 capsule 0    amphetamine-dextroamphetamine XR (Adderall XR) 20 mg 24 hr capsule Take 1 capsule (20 mg) by mouth once daily in the morning. Do not crush or chew. Do not fill before March 12, 2025. 30 capsule 0    norgestimate-ethinyl estradioL (Ortho-Cyclen) 0.25-35 mg-mcg tablet Take 1 tablet by mouth once daily. (Patient not taking: Reported on 4/21/2025) 28 tablet 12     No current facility-administered medications on file prior to visit.   [5] No Known Allergies

## 2025-04-26 LAB
25(OH)D3+25(OH)D2 SERPL-MCNC: 40 NG/ML (ref 30–100)
ALBUMIN SERPL-MCNC: 4.5 G/DL (ref 3.6–5.1)
ALP SERPL-CCNC: 62 U/L (ref 37–153)
ALT SERPL-CCNC: 10 U/L (ref 6–29)
AMPHET UR-MCNC: 1787 NG/ML
ANION GAP SERPL CALCULATED.4IONS-SCNC: 12 MMOL/L (CALC) (ref 7–17)
AST SERPL-CCNC: 15 U/L (ref 10–35)
BILIRUB SERPL-MCNC: 0.4 MG/DL (ref 0.2–1.2)
BUN SERPL-MCNC: 14 MG/DL (ref 7–25)
CALCIUM SERPL-MCNC: 9.3 MG/DL (ref 8.6–10.4)
CHLORIDE SERPL-SCNC: 103 MMOL/L (ref 98–110)
CO2 SERPL-SCNC: 23 MMOL/L (ref 20–32)
CORTIS AM PEAK SERPL-MCNC: 14 MCG/DL
CREAT SERPL-MCNC: 0.52 MG/DL (ref 0.5–1.03)
DHEA-S SERPL-MCNC: 50 MCG/DL (ref 15–205)
EGFRCR SERPLBLD CKD-EPI 2021: 113 ML/MIN/1.73M2
ESTRADIOL SERPL-MCNC: 63 PG/ML
ESTRONE SERPL-MCNC: 31 PG/ML
GLUCOSE SERPL-MCNC: 75 MG/DL (ref 65–99)
MDA UR-MCNC: NEGATIVE NG/ML
MDEA UR-MCNC: NEGATIVE NG/ML
MDMA UR-MCNC: NEGATIVE NG/ML
METHAMPHET UR-MCNC: NEGATIVE NG/ML
PHENTERMINE UR-MCNC: NEGATIVE NG/ML
POTASSIUM SERPL-SCNC: 4.1 MMOL/L (ref 3.5–5.3)
PROGEST SERPL-MCNC: <0.5 NG/ML
PROT SERPL-MCNC: 7 G/DL (ref 6.1–8.1)
SHBG SERPL-SCNC: 110 NMOL/L (ref 17–124)
SODIUM SERPL-SCNC: 138 MMOL/L (ref 135–146)
TESTOST FREE SERPL-MCNC: 1 PG/ML (ref 0.1–6.4)
TESTOST SERPL-MCNC: 17 NG/DL (ref 2–45)

## 2025-05-03 DIAGNOSIS — F98.8 ATTENTION DEFICIT DISORDER (ADD) WITHOUT HYPERACTIVITY: ICD-10-CM

## 2025-05-03 DIAGNOSIS — M47.816 SPONDYLOSIS OF LUMBAR REGION WITHOUT MYELOPATHY OR RADICULOPATHY: ICD-10-CM

## 2025-05-03 RX ORDER — HYDROCODONE BITARTRATE AND ACETAMINOPHEN 5; 300 MG/1; MG/1
1 TABLET ORAL DAILY
Start: 2025-07-06

## 2025-05-03 RX ORDER — HYDROCODONE BITARTRATE AND ACETAMINOPHEN 5; 300 MG/1; MG/1
1 TABLET ORAL DAILY
Start: 2025-06-08

## 2025-05-03 RX ORDER — HYDROCODONE BITARTRATE AND ACETAMINOPHEN 5; 300 MG/1; MG/1
1 TABLET ORAL DAILY
Start: 2025-05-11

## 2025-05-22 DIAGNOSIS — M47.816 SPONDYLOSIS OF LUMBAR REGION WITHOUT MYELOPATHY OR RADICULOPATHY: ICD-10-CM

## 2025-05-22 RX ORDER — HYDROCODONE BITARTRATE AND ACETAMINOPHEN 5; 300 MG/1; MG/1
1 TABLET ORAL DAILY
Qty: 20 TABLET | Refills: 0 | Status: SHIPPED | OUTPATIENT
Start: 2025-07-06

## 2025-07-03 ENCOUNTER — APPOINTMENT (OUTPATIENT)
Dept: PRIMARY CARE | Facility: CLINIC | Age: 50
End: 2025-07-03
Payer: COMMERCIAL

## 2025-07-03 VITALS
OXYGEN SATURATION: 100 % | WEIGHT: 131.8 LBS | DIASTOLIC BLOOD PRESSURE: 82 MMHG | SYSTOLIC BLOOD PRESSURE: 117 MMHG | BODY MASS INDEX: 23.91 KG/M2 | HEART RATE: 94 BPM

## 2025-07-03 DIAGNOSIS — F98.8 ATTENTION DEFICIT DISORDER (ADD) WITHOUT HYPERACTIVITY: ICD-10-CM

## 2025-07-03 DIAGNOSIS — M47.816 SPONDYLOSIS OF LUMBAR REGION WITHOUT MYELOPATHY OR RADICULOPATHY: ICD-10-CM

## 2025-07-03 PROCEDURE — 99213 OFFICE O/P EST LOW 20 MIN: CPT | Performed by: FAMILY MEDICINE

## 2025-07-03 RX ORDER — DEXTROAMPHETAMINE SACCHARATE, AMPHETAMINE ASPARTATE MONOHYDRATE, DEXTROAMPHETAMINE SULFATE AND AMPHETAMINE SULFATE 5; 5; 5; 5 MG/1; MG/1; MG/1; MG/1
20 CAPSULE, EXTENDED RELEASE ORAL EVERY MORNING
Qty: 30 CAPSULE | Refills: 0 | Status: SHIPPED | OUTPATIENT
Start: 2025-09-02 | End: 2025-10-02

## 2025-07-03 RX ORDER — DEXTROAMPHETAMINE SACCHARATE, AMPHETAMINE ASPARTATE MONOHYDRATE, DEXTROAMPHETAMINE SULFATE AND AMPHETAMINE SULFATE 5; 5; 5; 5 MG/1; MG/1; MG/1; MG/1
20 CAPSULE, EXTENDED RELEASE ORAL EVERY MORNING
Qty: 30 CAPSULE | Refills: 0 | Status: SHIPPED | OUTPATIENT
Start: 2025-08-05 | End: 2025-09-04

## 2025-07-03 RX ORDER — DEXTROAMPHETAMINE SACCHARATE, AMPHETAMINE ASPARTATE MONOHYDRATE, DEXTROAMPHETAMINE SULFATE AND AMPHETAMINE SULFATE 5; 5; 5; 5 MG/1; MG/1; MG/1; MG/1
20 CAPSULE, EXTENDED RELEASE ORAL EVERY MORNING
Qty: 30 CAPSULE | Refills: 0 | Status: SHIPPED | OUTPATIENT
Start: 2025-07-08 | End: 2025-08-07

## 2025-07-03 RX ORDER — HYDROCODONE BITARTRATE AND ACETAMINOPHEN 5; 300 MG/1; MG/1
1 TABLET ORAL DAILY PRN
Qty: 30 TABLET | Refills: 0 | Status: SHIPPED | OUTPATIENT
Start: 2025-07-08 | End: 2025-08-07

## 2025-07-03 RX ORDER — HYDROCODONE BITARTRATE AND ACETAMINOPHEN 5; 300 MG/1; MG/1
1 TABLET ORAL DAILY PRN
Qty: 30 TABLET | Refills: 0 | Status: SHIPPED | OUTPATIENT
Start: 2025-09-02 | End: 2025-10-02

## 2025-07-03 RX ORDER — HYDROCODONE BITARTRATE AND ACETAMINOPHEN 5; 300 MG/1; MG/1
1 TABLET ORAL DAILY PRN
Qty: 30 TABLET | Refills: 0 | Status: SHIPPED | OUTPATIENT
Start: 2025-08-05 | End: 2025-09-04

## 2025-07-03 NOTE — PROGRESS NOTES
HPI 50 y.o. female presents for evaluation and refill of controlled substance.      Vicodin 5/300 used to manage chronic lower back arthritic pain.  Patient takes 1 daily or one half twice daily.  Her last dose was this morning.  Last refill on Niki 10, 2025    Attention deficit disorder is managed with Adderall XR 20 mg once daily.  Last refill June 13, 2025.    Medical History[1]   Surgical History[2]  Family History[3]   Social History     Socioeconomic History    Marital status:      Spouse name: Not on file    Number of children: Not on file    Years of education: Not on file    Highest education level: Not on file   Occupational History    Not on file   Tobacco Use    Smoking status: Some Days     Types: Cigarettes    Smokeless tobacco: Never   Substance and Sexual Activity    Alcohol use: Never    Drug use: Yes     Types: Hydrocodone, Methylphenidate    Sexual activity: Not on file   Other Topics Concern    Not on file   Social History Narrative    Not on file     Social Drivers of Health     Financial Resource Strain: Not on file   Food Insecurity: Not on file   Transportation Needs: Not on file   Physical Activity: Not on file   Stress: Not on file   Social Connections: Not on file   Intimate Partner Violence: Not on file   Housing Stability: Not on file       Medications Ordered Prior to Encounter[4]    Allergies[5]    Visit Vitals  /82 (BP Location: Right arm, Patient Position: Sitting)   Pulse 94   Wt 59.8 kg (131 lb 12.8 oz)   SpO2 100%   BMI 23.91 kg/m²   Smoking Status Some Days   BSA 1.62 m²        EXAM:  Alert and oriented ×3.  No acute distress.  No tremors noted.  Gait is normal.  Mood and affect are normal.     Assessment/Diagnosis  1. Attention deficit disorder (ADD) without hyperactivity  - amphetamine-dextroamphetamine XR (Adderall XR) 20 mg 24 hr capsule; Take 1 capsule (20 mg) by mouth once daily in the morning. Do not crush or chew. Do not fill before July 8, 2025.  Dispense:  30 capsule; Refill: 0  - amphetamine-dextroamphetamine XR (Adderall XR) 20 mg 24 hr capsule; Take 1 capsule (20 mg) by mouth once daily in the morning. Do not crush or chew. Do not fill before August 5, 2025.  Dispense: 30 capsule; Refill: 0  - amphetamine-dextroamphetamine XR (Adderall XR) 20 mg 24 hr capsule; Take 1 capsule (20 mg) by mouth once daily in the morning. Do not crush or chew. Do not fill before September 2, 2025.  Dispense: 30 capsule; Refill: 0    2. Spondylosis of lumbar region without myelopathy or radiculopathy  - HYDROcodone-acetaminophen (Vicodin) 5-300 mg tablet; Take 1 tablet by mouth once daily as needed for severe pain (7 - 10). Do not fill before July 8, 2025.  Dispense: 30 tablet; Refill: 0  - HYDROcodone-acetaminophen (Vicodin) 5-300 mg tablet; Take 1 tablet by mouth once daily as needed for severe pain (7 - 10). Do not fill before August 5, 2025.  Dispense: 30 tablet; Refill: 0  - HYDROcodone-acetaminophen (Vicodin) 5-300 mg tablet; Take 1 tablet by mouth once daily as needed for severe pain (7 - 10). Do not fill before September 2, 2025.  Dispense: 30 tablet; Refill: 0        Plan    [place] reviewed.  Controlled Substance Agreement is current.  Urine drug screen up to date.    CONTROLLED SUBSTANCE USE:   Patient is aware of Dr. Diaz's and Estefany Darling's practice rules for use of scheduled medication.  Has a signed contract stating that patient will only receive controlled substance prescriptions from Dr. Diaz, will only receive a one month supply, will fill prescriptions at one pharmacy, and agrees to a random urine drug screen.  Patient is aware that she must have an office appointment every 90 days to continue to receive benzodiazepines or narcotics.        Follow up in 3 months as scheduled for medical management    I will continue to monitor, evaluate, assess and treat all problems/diagnoses as appropriate and continue to collaborate with specialists.    Contact office or send a [facility] message with any questions or concerns    Patient will only be notified of labs that require medical intervention.    Prescriptions will not be filled unless you are compliant with your follow up appointments or have a follow up appointment scheduled as per instruction of your physician. Refills should be requested at the time of your visit.    **Charting was completed using voice recognition technology and may include unintended errors**    Gary Diaz DO, FACOFP  Senior Attending Physician  [facility]  [address]  [place] [id number] [phone]                 [1]   Past Medical History:  Diagnosis Date    Complete rotator cuff tear or rupture of right shoulder, not specified as traumatic 12/15/2017    Complete tear of right rotator cuff    Disease due to severe acute respiratory syndrome coronavirus 2 (SARS-CoV-2) 2024    Comment on above: 2019. Positive antibodies 2021;   [2]   Past Surgical History:  Procedure Laterality Date     SECTION, CLASSIC  2014     Section    COLONOSCOPY  2018    Complete Colonoscopy   [3] No family history on file.  [4]   Current Outpatient Medications on File Prior to Visit   Medication Sig Dispense Refill    amphetamine-dextroamphetamine XR (Adderall XR) 20 mg 24 hr capsule Take 1 capsule (20 mg) by mouth once daily in the morning. Do not crush or chew. Do not fill before 2025. 30 capsule 0    [START ON 2025] amphetamine-dextroamphetamine XR (Adderall XR) 20 mg 24 hr capsule Take 1 capsule (20 mg) by mouth once daily in the morning. Do not crush or chew. Do not fill before 2025. 30 capsule 0    HYDROcodone-acetaminophen (Vicodin) 5-300 mg tablet Take 1 tablet by mouth once daily. Do not fill before 2025.      HYDROcodone-acetaminophen (Vicodin) 5-300 mg tablet Take 1 tablet by mouth once daily. Do not fill before May 11, 2025.      [START ON  7/6/2025] HYDROcodone-acetaminophen (Vicodin) 5-300 mg tablet Take 1 tablet by mouth once daily. Do not fill before July 6, 2025. 20 tablet 0    amphetamine-dextroamphetamine XR (Adderall XR) 20 mg 24 hr capsule Take 1 capsule (20 mg) by mouth once daily in the morning. Do not crush or chew. Do not fill before May 11, 2025. 30 capsule 0    ascorbic acid (Vitamin C) 500 mg tablet Take 1 tablet (500 mg) by mouth once daily.      b complex 0.4 mg tablet Take 1 tablet by mouth once daily.      calcium carbonate-vit D3-min 600 mg calcium- 400 unit tablet Take 1 tablet by mouth in the morning and 1 tablet before bedtime.      celecoxib (CeleBREX) 100 mg capsule Take 1 capsule (100 mg) by mouth once daily. 90 capsule 2    cholecalciferol (Vitamin D3) 25 MCG (1000 UT) capsule Take 1 capsule (25 mcg) by mouth once daily.      cyclobenzaprine (Flexeril) 10 mg tablet Take 1 tablet (10 mg) by mouth 3 times a day as needed for muscle spasms. 60 tablet 2    ergocalciferol (Vitamin D-2) 1.25 MG (68055 UT) capsule Take 1 capsule (50,000 Units) by mouth 1 (one) time per week. 12 capsule 3    fluticasone (Flonase) 50 mcg/actuation nasal spray Administer 2 sprays into each nostril once daily. 16 g 3    iron bis-glycinat/vit C/FA/B12 (GENTLE IRON ORAL) Take by mouth.      naloxone (Narcan) 4 mg/0.1 mL nasal spray Administer into affected nostril(s). 1 actuation in one nostril x 1, may reat dose q2-3 mins until responsive or EMS arrives      norgestimate-ethinyl estradioL (Ortho-Cyclen) 0.25-35 mg-mcg tablet Take 1 tablet by mouth once daily. (Patient not taking: Reported on 4/21/2025) 28 tablet 12    zinc gluconate 100 mg tablet Take by mouth.       No current facility-administered medications on file prior to visit.   [5] No Known Allergies

## 2025-08-06 DIAGNOSIS — J30.1 SEASONAL ALLERGIC RHINITIS DUE TO POLLEN: ICD-10-CM

## 2025-08-07 RX ORDER — FLUTICASONE PROPIONATE 50 MCG
2 SPRAY, SUSPENSION (ML) NASAL DAILY
Qty: 16 G | Refills: 0 | OUTPATIENT
Start: 2025-08-07

## 2025-08-19 DIAGNOSIS — Z12.31 ENCOUNTER FOR SCREENING MAMMOGRAM FOR BREAST CANCER: ICD-10-CM

## 2025-09-01 DIAGNOSIS — E34.9 HORMONE IMBALANCE: ICD-10-CM

## 2025-09-01 DIAGNOSIS — Z79.890 HORMONE REPLACEMENT THERAPY: ICD-10-CM

## 2025-09-03 RX ORDER — NORGESTIMATE AND ETHINYL ESTRADIOL 0.25-0.035
1 KIT ORAL DAILY
Qty: 28 TABLET | Refills: 0 | OUTPATIENT
Start: 2025-09-03

## 2025-11-13 ENCOUNTER — APPOINTMENT (OUTPATIENT)
Dept: PRIMARY CARE | Facility: CLINIC | Age: 50
End: 2025-11-13
Payer: COMMERCIAL

## 2026-01-19 ENCOUNTER — APPOINTMENT (OUTPATIENT)
Dept: PRIMARY CARE | Facility: CLINIC | Age: 51
End: 2026-01-19
Payer: COMMERCIAL

## 2026-04-23 ENCOUNTER — APPOINTMENT (OUTPATIENT)
Dept: PRIMARY CARE | Facility: CLINIC | Age: 51
End: 2026-04-23
Payer: COMMERCIAL